# Patient Record
Sex: FEMALE | Race: WHITE | NOT HISPANIC OR LATINO | URBAN - METROPOLITAN AREA
[De-identification: names, ages, dates, MRNs, and addresses within clinical notes are randomized per-mention and may not be internally consistent; named-entity substitution may affect disease eponyms.]

---

## 2017-04-25 ENCOUNTER — EMERGENCY (EMERGENCY)
Facility: HOSPITAL | Age: 22
LOS: 1 days | Discharge: HOME | End: 2017-04-25
Attending: EMERGENCY MEDICINE
Payer: COMMERCIAL

## 2017-04-25 VITALS
DIASTOLIC BLOOD PRESSURE: 85 MMHG | HEIGHT: 63 IN | WEIGHT: 156.97 LBS | SYSTOLIC BLOOD PRESSURE: 131 MMHG | HEART RATE: 80 BPM | OXYGEN SATURATION: 100 % | TEMPERATURE: 98 F | RESPIRATION RATE: 16 BRPM
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
## 2017-04-25 DIAGNOSIS — R51 HEADACHE: ICD-10-CM

## 2017-04-25 DIAGNOSIS — S16.1XXA STRAIN OF MUSCLE, FASCIA AND TENDON AT NECK LEVEL, INITIAL ENCOUNTER: ICD-10-CM
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
## 2017-04-25 DIAGNOSIS — M25.511 PAIN IN RIGHT SHOULDER: ICD-10-CM

## 2017-04-25 DIAGNOSIS — M25.512 PAIN IN LEFT SHOULDER: ICD-10-CM

## 2017-04-25 PROCEDURE — 99283 EMERGENCY DEPT VISIT LOW MDM: CPT

## 2017-04-25 RX ORDER — IBUPROFEN 200 MG
600 TABLET ORAL ONCE
Qty: 0 | Refills: 0 | Status: COMPLETED | OUTPATIENT
Start: 2017-04-25 | End: 2017-04-25

## 2017-04-25 RX ADMIN — Medication 600 MILLIGRAM(S): at 20:15
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
## 2017-04-25 NOTE — ED STATDOCS - MUSCULOSKELETAL, MLM
No midline c-spine TTP, FROM c-spine. No midline thoracic or lumbar spine TTP. Upper and lower extremities: no localized bony TTP, FROM.

## 2017-04-25 NOTE — ED STATDOCS - PLAN OF CARE
Apply ice or heat (your preference) to affected area for 15-20 minutes every few hours for the next few days.  Use as much or as frequently as desired. take ibuprofen 600mg every 6 hours as needed for aches and pains.  Take robaxin every 6 hours as needed for muscle tightness/ spasm.  Avoid heavy lifting and strenuous activity until aches and pains are improved.  Return immediately to the ER for re-evaluation if your symptoms intensify or if need symptoms (not currently present) develop. Otherwise, follow-up with your doctor in 2-3 days for re-examination.
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
## 2017-04-25 NOTE — ED STATDOCS - NS ED MD SCRIBE ATTENDING SCRIBE SECTIONS
REVIEW OF SYSTEMS/PAST MEDICAL/SURGICAL/SOCIAL HISTORY/PHYSICAL EXAM/HIV/VITAL SIGNS( Pullset)/HISTORY OF PRESENT ILLNESS/DISPOSITION

## 2017-06-01 ENCOUNTER — APPOINTMENT (OUTPATIENT)
Dept: ORTHOPEDIC SURGERY | Facility: CLINIC | Age: 22
End: 2017-06-01

## 2017-07-26 ENCOUNTER — OTHER (OUTPATIENT)
Age: 22
End: 2017-07-26

## 2017-08-03 ENCOUNTER — APPOINTMENT (OUTPATIENT)
Dept: ORTHOPEDIC SURGERY | Facility: CLINIC | Age: 22
End: 2017-08-03
Payer: COMMERCIAL

## 2017-08-03 VITALS
BODY MASS INDEX: 26.46 KG/M2 | SYSTOLIC BLOOD PRESSURE: 137 MMHG | HEIGHT: 64 IN | WEIGHT: 155 LBS | DIASTOLIC BLOOD PRESSURE: 87 MMHG | HEART RATE: 57 BPM

## 2017-08-03 DIAGNOSIS — M43.10 SPONDYLOLISTHESIS, SITE UNSPECIFIED: ICD-10-CM

## 2017-08-03 DIAGNOSIS — Z87.39 PERSONAL HISTORY OF OTHER DISEASES OF THE MUSCULOSKELETAL SYSTEM AND CONNECTIVE TISSUE: ICD-10-CM

## 2017-08-03 DIAGNOSIS — Z78.9 OTHER SPECIFIED HEALTH STATUS: ICD-10-CM

## 2017-08-03 PROCEDURE — 99214 OFFICE O/P EST MOD 30 MIN: CPT

## 2019-05-10 ENCOUNTER — APPOINTMENT (OUTPATIENT)
Dept: OBGYN | Facility: CLINIC | Age: 24
End: 2019-05-10
Payer: COMMERCIAL

## 2019-05-10 VITALS
HEIGHT: 64 IN | DIASTOLIC BLOOD PRESSURE: 80 MMHG | SYSTOLIC BLOOD PRESSURE: 110 MMHG | WEIGHT: 150 LBS | BODY MASS INDEX: 25.61 KG/M2

## 2019-05-10 LAB
HCG UR QL: NEGATIVE
QUALITY CONTROL: YES

## 2019-05-10 PROCEDURE — 99203 OFFICE O/P NEW LOW 30 MIN: CPT

## 2019-05-10 NOTE — DISCUSSION/SUMMARY
[FreeTextEntry1] : a24 years old white female came to the office as a new GYN he is complaining of dysmenorrhea that is getting worse that starts the day before her periods and lasts all fluid. Pain goes away with midol but only slightly physical exam within normal limits pelvic exam no adnexal tenderness normal size uterus no further workup needed since the patient is a virgin scars the pelvis was then history sounds like the patient might have endometriosis when placed on birth control pills and Anaprox for pain and follow accordingly I spent 30 minutes with the patient

## 2019-05-10 NOTE — PHYSICAL EXAM
[Alert] : alert [Awake] : awake [Mass] : no breast mass [Acute Distress] : no acute distress [Nipple Discharge] : no nipple discharge [Soft] : soft [Axillary LAD] : no axillary lymphadenopathy [Tender] : non tender [Oriented x3] : oriented to person, place, and time [Normal] : uterus [No Bleeding] : there was no active vaginal bleeding [Uterine Adnexae] : were not tender and not enlarged

## 2019-06-18 ENCOUNTER — APPOINTMENT (OUTPATIENT)
Dept: OBGYN | Facility: CLINIC | Age: 24
End: 2019-06-18
Payer: COMMERCIAL

## 2019-06-18 VITALS
DIASTOLIC BLOOD PRESSURE: 64 MMHG | BODY MASS INDEX: 25.61 KG/M2 | HEIGHT: 64 IN | SYSTOLIC BLOOD PRESSURE: 108 MMHG | WEIGHT: 150 LBS

## 2019-06-18 PROCEDURE — 99213 OFFICE O/P EST LOW 20 MIN: CPT

## 2019-06-18 NOTE — DISCUSSION/SUMMARY
[FreeTextEntry1] : The office urgent visit because of a painful lump on the right breast patient's last period was a week ago physical exam within normal limits on the right breast there is a freely mobile possible cyst at the 11:00 position above the area with a we'll send the patient for sonogram no signs of infection I spent 15 minutes with the patient

## 2019-07-12 ENCOUNTER — RX RENEWAL (OUTPATIENT)
Age: 24
End: 2019-07-12

## 2019-07-12 ENCOUNTER — RESULT REVIEW (OUTPATIENT)
Age: 24
End: 2019-07-12

## 2019-11-08 ENCOUNTER — APPOINTMENT (OUTPATIENT)
Dept: OBGYN | Facility: CLINIC | Age: 24
End: 2019-11-08

## 2020-02-19 ENCOUNTER — TRANSCRIPTION ENCOUNTER (OUTPATIENT)
Age: 25
End: 2020-02-19

## 2020-04-26 ENCOUNTER — MESSAGE (OUTPATIENT)
Age: 25
End: 2020-04-26

## 2020-05-08 ENCOUNTER — APPOINTMENT (OUTPATIENT)
Dept: DISASTER EMERGENCY | Facility: CLINIC | Age: 25
End: 2020-05-08

## 2020-05-08 LAB
SARS-COV-2 IGG SERPL IA-ACNC: 0 INDEX
SARS-COV-2 IGG SERPL QL IA: NEGATIVE

## 2020-11-27 ENCOUNTER — APPOINTMENT (OUTPATIENT)
Dept: INFUSION THERAPY | Facility: HOSPITAL | Age: 25
End: 2020-11-27

## 2020-11-27 ENCOUNTER — LABORATORY RESULT (OUTPATIENT)
Age: 25
End: 2020-11-27

## 2021-03-16 ENCOUNTER — NON-APPOINTMENT (OUTPATIENT)
Age: 26
End: 2021-03-16

## 2021-03-28 ENCOUNTER — RESULT CHARGE (OUTPATIENT)
Age: 26
End: 2021-03-28

## 2021-03-29 ENCOUNTER — NON-APPOINTMENT (OUTPATIENT)
Age: 26
End: 2021-03-29

## 2021-03-29 ENCOUNTER — TRANSCRIPTION ENCOUNTER (OUTPATIENT)
Age: 26
End: 2021-03-29

## 2021-03-29 ENCOUNTER — APPOINTMENT (OUTPATIENT)
Dept: INTERNAL MEDICINE | Facility: CLINIC | Age: 26
End: 2021-03-29
Payer: COMMERCIAL

## 2021-03-29 VITALS
TEMPERATURE: 98 F | RESPIRATION RATE: 16 BRPM | SYSTOLIC BLOOD PRESSURE: 118 MMHG | HEART RATE: 73 BPM | BODY MASS INDEX: 29.19 KG/M2 | DIASTOLIC BLOOD PRESSURE: 72 MMHG | OXYGEN SATURATION: 98 % | WEIGHT: 171 LBS | HEIGHT: 64 IN

## 2021-03-29 DIAGNOSIS — Z13.6 ENCOUNTER FOR SCREENING FOR CARDIOVASCULAR DISORDERS: ICD-10-CM

## 2021-03-29 DIAGNOSIS — Z80.0 FAMILY HISTORY OF MALIGNANT NEOPLASM OF DIGESTIVE ORGANS: ICD-10-CM

## 2021-03-29 DIAGNOSIS — Z87.39 PERSONAL HISTORY OF OTHER DISEASES OF THE MUSCULOSKELETAL SYSTEM AND CONNECTIVE TISSUE: ICD-10-CM

## 2021-03-29 DIAGNOSIS — Z11.4 ENCOUNTER FOR SCREENING FOR HUMAN IMMUNODEFICIENCY VIRUS [HIV]: ICD-10-CM

## 2021-03-29 DIAGNOSIS — N60.01 SOLITARY CYST OF RIGHT BREAST: ICD-10-CM

## 2021-03-29 DIAGNOSIS — Z80.3 FAMILY HISTORY OF MALIGNANT NEOPLASM OF BREAST: ICD-10-CM

## 2021-03-29 DIAGNOSIS — Z83.3 FAMILY HISTORY OF DIABETES MELLITUS: ICD-10-CM

## 2021-03-29 LAB
BILIRUB UR QL STRIP: NORMAL
CLARITY UR: CLEAR
COLLECTION METHOD: NORMAL
GLUCOSE UR-MCNC: NORMAL
HCG UR QL: 0.2 EU/DL
HGB UR QL STRIP.AUTO: ABNORMAL
KETONES UR-MCNC: NORMAL
LEUKOCYTE ESTERASE UR QL STRIP: NORMAL
NITRITE UR QL STRIP: NORMAL
PH UR STRIP: 6.5
PROT UR STRIP-MCNC: NORMAL
SP GR UR STRIP: 1.01

## 2021-03-29 PROCEDURE — 99072 ADDL SUPL MATRL&STAF TM PHE: CPT

## 2021-03-29 PROCEDURE — 99385 PREV VISIT NEW AGE 18-39: CPT | Mod: 25

## 2021-03-29 PROCEDURE — 81003 URINALYSIS AUTO W/O SCOPE: CPT | Mod: QW

## 2021-03-29 PROCEDURE — 36415 COLL VENOUS BLD VENIPUNCTURE: CPT

## 2021-03-29 PROCEDURE — G0442 ANNUAL ALCOHOL SCREEN 15 MIN: CPT | Mod: NC

## 2021-03-29 PROCEDURE — 93000 ELECTROCARDIOGRAM COMPLETE: CPT | Mod: 59

## 2021-03-29 NOTE — HEALTH RISK ASSESSMENT
[Good] : ~his/her~  mood as  good [Yes] : Yes [2 - 4 times a month (2 pts)] : 2-4 times a month (2 points) [3 or 4 (1 pt)] : 3 or 4  (1 point) [Never (0 pts)] : Never (0 points) [No] : In the past 12 months have you used drugs other than those required for medical reasons? No [No falls in past year] : Patient reported no falls in the past year [1] : 2) Feeling down, depressed, or hopeless for several days (1) [HIV Test offered] : HIV Test offered [Hepatitis C test offered] : Hepatitis C test offered [With Family] : lives with family [# of Members in Household ___] :  household currently consist of [unfilled] member(s) [Employed] : employed [College] : College [Single] : single [# Of Children ___] : has [unfilled] children [Feels Safe at Home] : Feels safe at home [Fully functional (bathing, dressing, toileting, transferring, walking, feeding)] : Fully functional (bathing, dressing, toileting, transferring, walking, feeding) [Fully functional (using the telephone, shopping, preparing meals, housekeeping, doing laundry, using] : Fully functional and needs no help or supervision to perform IADLs (using the telephone, shopping, preparing meals, housekeeping, doing laundry, using transportation, managing medications and managing finances) [Reports normal functional visual acuity (ie: able to read med bottle)] : Reports normal functional visual acuity [Smoke Detector] : smoke detector [Carbon Monoxide Detector] : carbon monoxide detector [Seat Belt] :  uses seat belt [Patient/Caregiver unclear of wishes] : Patient/Caregiver unclear of wishes [] : No [Audit-CScore] : 3 [de-identified] : 3x a weel walk  [de-identified] : Regular  [BQQ9Rhern] : 2 [LNI4Svtge] : 5 [Change in mental status noted] : No change in mental status noted [Sexually Active] : not sexually active [Reports changes in vision] : Reports no changes in vision [TB Exposure] : is not being exposed to tuberculosis [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

## 2021-03-29 NOTE — HISTORY OF PRESENT ILLNESS
[FreeTextEntry1] : Physical exam  [de-identified] : Ms. MARCIAL is a 26 year  female, who present to the office for new patient physical exam.\par States she always had anxiety but after being diagnosed with covid back in November her anxiety has gotten worse.  States it does effect her ADL.  When she gets anxiety her finger tingles and she feels chest tightness and heart palpitations\par Takes Naprosyn for painful menses - Denies having a PAP but does see GYN

## 2021-03-29 NOTE — REVIEW OF SYSTEMS
[Fatigue] : fatigue [Dysmenorrhea] : dysmenorrhea [Back Pain] : back pain [Anxiety] : anxiety [Negative] : Heme/Lymph [Fever] : no fever [Chills] : no chills [Hot Flashes] : no hot flashes [Night Sweats] : no night sweats [Recent Change In Weight] : ~T no recent weight change [Pain] : no pain [Vision Problems] : no vision problems [Itching] : no itching [Earache] : no earache [Hoarseness] : no hoarseness [Nasal Discharge] : no nasal discharge [Sore Throat] : no sore throat [Postnasal Drip] : no postnasal drip [Chest Pain] : no chest pain [Palpitations] : no palpitations [Leg Claudication] : no leg claudication [Lower Ext Edema] : no lower extremity edema [Shortness Of Breath] : no shortness of breath [Cough] : no cough [Dyspnea on Exertion] : no dyspnea on exertion [Abdominal Pain] : no abdominal pain [Nausea] : no nausea [Constipation] : no constipation [Diarrhea] : diarrhea [Vomiting] : no vomiting [Heartburn] : no heartburn [Melena] : no melena [Dysuria] : no dysuria [Hematuria] : no hematuria [Frequency] : no frequency [Joint Pain] : no joint pain [Joint Stiffness] : no joint stiffness [Muscle Pain] : no muscle pain [Itching] : no itching [Nail Changes] : no nail changes [Hair Changes] : no hair changes [Skin Rash] : no skin rash [Headache] : no headache [Dizziness] : no dizziness [Memory Loss] : no memory loss [Unsteady Walking] : no ataxia [Depression] : no depression [Swollen Glands] : no swollen glands

## 2021-03-29 NOTE — ASSESSMENT
[FreeTextEntry1] : Ms. MARCIAL is a 26 year  female, who present to the office for new patient physical

## 2021-03-29 NOTE — PHYSICAL EXAM
[No Acute Distress] : no acute distress [Well Nourished] : well nourished [Well Developed] : well developed [Well-Appearing] : well-appearing [Normal Sclera/Conjunctiva] : normal sclera/conjunctiva [PERRL] : pupils equal round and reactive to light [EOMI] : extraocular movements intact [Normal Outer Ear/Nose] : the outer ears and nose were normal in appearance [Normal Oropharynx] : the oropharynx was normal [No JVD] : no jugular venous distention [No Lymphadenopathy] : no lymphadenopathy [Supple] : supple [Thyroid Normal, No Nodules] : the thyroid was normal and there were no nodules present [No Respiratory Distress] : no respiratory distress  [No Accessory Muscle Use] : no accessory muscle use [Clear to Auscultation] : lungs were clear to auscultation bilaterally [Normal Rate] : normal rate  [Regular Rhythm] : with a regular rhythm [Normal S1, S2] : normal S1 and S2 [No Carotid Bruits] : no carotid bruits [No Abdominal Bruit] : a ~M bruit was not heard ~T in the abdomen [No Varicosities] : no varicosities [Pedal Pulses Present] : the pedal pulses are present [No Edema] : there was no peripheral edema [No Palpable Aorta] : no palpable aorta [No Extremity Clubbing/Cyanosis] : no extremity clubbing/cyanosis [Soft] : abdomen soft [Non Tender] : non-tender [Non-distended] : non-distended [No Masses] : no abdominal mass palpated [No HSM] : no HSM [Normal Bowel Sounds] : normal bowel sounds [Normal Posterior Cervical Nodes] : no posterior cervical lymphadenopathy [Normal Anterior Cervical Nodes] : no anterior cervical lymphadenopathy [No CVA Tenderness] : no CVA  tenderness [No Spinal Tenderness] : no spinal tenderness [No Joint Swelling] : no joint swelling [Grossly Normal Strength/Tone] : grossly normal strength/tone [No Rash] : no rash [Coordination Grossly Intact] : coordination grossly intact [No Focal Deficits] : no focal deficits [Normal Gait] : normal gait [Deep Tendon Reflexes (DTR)] : deep tendon reflexes were 2+ and symmetric [Normal Affect] : the affect was normal [Normal Insight/Judgement] : insight and judgment were intact [de-identified] : with murmur 1/6

## 2021-04-01 ENCOUNTER — OUTPATIENT (OUTPATIENT)
Dept: OUTPATIENT SERVICES | Facility: HOSPITAL | Age: 26
LOS: 1 days | End: 2021-04-01
Payer: COMMERCIAL

## 2021-04-01 ENCOUNTER — APPOINTMENT (OUTPATIENT)
Dept: ULTRASOUND IMAGING | Facility: CLINIC | Age: 26
End: 2021-04-01
Payer: COMMERCIAL

## 2021-04-01 DIAGNOSIS — R10.9 UNSPECIFIED ABDOMINAL PAIN: ICD-10-CM

## 2021-04-01 LAB
25(OH)D3 SERPL-MCNC: 15.5 NG/ML
ALBUMIN SERPL ELPH-MCNC: 4.8 G/DL
ALP BLD-CCNC: 67 U/L
ALT SERPL-CCNC: 32 U/L
ANION GAP SERPL CALC-SCNC: 10 MMOL/L
AST SERPL-CCNC: 26 U/L
BACTERIA UR CULT: NORMAL
BASOPHILS # BLD AUTO: 0.02 K/UL
BASOPHILS NFR BLD AUTO: 0.4 %
BILIRUB SERPL-MCNC: 0.5 MG/DL
BUN SERPL-MCNC: 6 MG/DL
CALCIUM SERPL-MCNC: 9.6 MG/DL
CHLORIDE SERPL-SCNC: 102 MMOL/L
CHOLEST SERPL-MCNC: 227 MG/DL
CO2 SERPL-SCNC: 26 MMOL/L
CREAT SERPL-MCNC: 0.7 MG/DL
EOSINOPHIL # BLD AUTO: 0.06 K/UL
EOSINOPHIL NFR BLD AUTO: 1.3 %
ESTROGEN SERPL-MCNC: 115 PG/ML
FSH SERPL-MCNC: 6 IU/L
GLUCOSE SERPL-MCNC: 92 MG/DL
HCT VFR BLD CALC: 33.1 %
HCV AB SER QL: NONREACTIVE
HCV S/CO RATIO: 0.08 S/CO
HDLC SERPL-MCNC: 47 MG/DL
HGB BLD-MCNC: 11.1 G/DL
HIV1+2 AB SPEC QL IA.RAPID: NONREACTIVE
IMM GRANULOCYTES NFR BLD AUTO: 0.2 %
LDLC SERPL CALC-MCNC: 159 MG/DL
LH SERPL-ACNC: 4.6 IU/L
LYMPHOCYTES # BLD AUTO: 1.61 K/UL
LYMPHOCYTES NFR BLD AUTO: 35.2 %
MAN DIFF?: NORMAL
MCHC RBC-ENTMCNC: 32.2 PG
MCHC RBC-ENTMCNC: 33.5 GM/DL
MCV RBC AUTO: 95.9 FL
MONOCYTES # BLD AUTO: 0.31 K/UL
MONOCYTES NFR BLD AUTO: 6.8 %
NEUTROPHILS # BLD AUTO: 2.56 K/UL
NEUTROPHILS NFR BLD AUTO: 56.1 %
NONHDLC SERPL-MCNC: 180 MG/DL
PLATELET # BLD AUTO: 210 K/UL
POTASSIUM SERPL-SCNC: 4.1 MMOL/L
PROT SERPL-MCNC: 7 G/DL
RBC # BLD: 3.45 M/UL
RBC # FLD: 12.5 %
SODIUM SERPL-SCNC: 138 MMOL/L
TESTOST SERPL-MCNC: 59.9 NG/DL
TRIGL SERPL-MCNC: 104 MG/DL
TSH SERPL-ACNC: 1.26 UIU/ML
WBC # FLD AUTO: 4.57 K/UL

## 2021-04-01 PROCEDURE — 76856 US EXAM PELVIC COMPLETE: CPT | Mod: 26

## 2021-04-01 PROCEDURE — 76856 US EXAM PELVIC COMPLETE: CPT

## 2021-04-05 ENCOUNTER — APPOINTMENT (OUTPATIENT)
Dept: OBGYN | Facility: CLINIC | Age: 26
End: 2021-04-05
Payer: COMMERCIAL

## 2021-04-05 VITALS — TEMPERATURE: 97.4 F

## 2021-04-05 PROCEDURE — 99072 ADDL SUPL MATRL&STAF TM PHE: CPT

## 2021-04-05 PROCEDURE — 99395 PREV VISIT EST AGE 18-39: CPT

## 2021-04-05 NOTE — DISCUSSION/SUMMARY
[FreeTextEntry1] : ZEFERINO MARCIAL is a 26 year female here for WWE, chronic pelvic possible endometriosis, normal exam\par - pap/hpv, GC/CH sent\par - TA US from last week - report pending but imaging looks WNL\par - SBE reviewed\par - Birth Control discussed - recommended mirena IUD, also discussed orlissa\par - free testosterone and dhea-s sent\par - will call with results and patient will let me know what she decides

## 2021-04-05 NOTE — PHYSICAL EXAM
[Appropriately responsive] : appropriately responsive [Alert] : alert [No Acute Distress] : no acute distress [No Lymphadenopathy] : no lymphadenopathy [Regular Rate Rhythm] : regular rate rhythm [Soft] : soft [Non-tender] : non-tender [Non-distended] : non-distended [No HSM] : No HSM [No Lesions] : no lesions [No Mass] : no mass [Oriented x3] : oriented x3 [Examination Of The Breasts] : a normal appearance [No Masses] : no breast masses were palpable [Labia Majora] : normal [Labia Minora] : normal [Normal] : normal [Uterine Adnexae] : normal

## 2021-04-06 LAB
C TRACH RRNA SPEC QL NAA+PROBE: NOT DETECTED
HPV HIGH+LOW RISK DNA PNL CVX: NOT DETECTED
N GONORRHOEA RRNA SPEC QL NAA+PROBE: NOT DETECTED
SOURCE TP AMPLIFICATION: NORMAL

## 2021-04-07 LAB — CYTOLOGY CVX/VAG DOC THIN PREP: NORMAL

## 2021-04-09 ENCOUNTER — NON-APPOINTMENT (OUTPATIENT)
Age: 26
End: 2021-04-09

## 2021-04-12 LAB
DHEA-SULFATE, SERUM: 451 UG/DL
TESTOST BND SERPL-MCNC: 1.8 PG/ML
TESTOST SERPL-MCNC: 25.5 NG/DL

## 2021-04-14 ENCOUNTER — NON-APPOINTMENT (OUTPATIENT)
Age: 26
End: 2021-04-14

## 2021-05-21 ENCOUNTER — APPOINTMENT (OUTPATIENT)
Dept: INTERNAL MEDICINE | Facility: CLINIC | Age: 26
End: 2021-05-21
Payer: COMMERCIAL

## 2021-05-21 VITALS
HEART RATE: 73 BPM | OXYGEN SATURATION: 98 % | RESPIRATION RATE: 16 BRPM | BODY MASS INDEX: 28.17 KG/M2 | TEMPERATURE: 97.2 F | HEIGHT: 64 IN | DIASTOLIC BLOOD PRESSURE: 72 MMHG | WEIGHT: 165 LBS | SYSTOLIC BLOOD PRESSURE: 116 MMHG

## 2021-05-21 PROCEDURE — 99213 OFFICE O/P EST LOW 20 MIN: CPT

## 2021-05-21 PROCEDURE — 99072 ADDL SUPL MATRL&STAF TM PHE: CPT

## 2021-05-24 NOTE — HISTORY OF PRESENT ILLNESS
[FreeTextEntry1] : Medication renewal and general check up [de-identified] : Ms. MARCIAL is a 26 year  female, who present to the office for medication renewal and a general check up.  States she is doing well on the lexapro.  Denies any side effects to current dose. \par Denies any recent illnesses \par Did see GYN dx with ? PCOS  on BCP

## 2021-05-24 NOTE — REVIEW OF SYSTEMS
[Fatigue] : fatigue [Dysmenorrhea] : dysmenorrhea [Anxiety] : anxiety [Negative] : Heme/Lymph [Fever] : no fever [Chills] : no chills [Hot Flashes] : no hot flashes [Night Sweats] : no night sweats [Recent Change In Weight] : ~T no recent weight change [Pain] : no pain [Vision Problems] : no vision problems [Itching] : no itching [Earache] : no earache [Hoarseness] : no hoarseness [Nasal Discharge] : no nasal discharge [Sore Throat] : no sore throat [Chest Pain] : no chest pain [Postnasal Drip] : no postnasal drip [Palpitations] : no palpitations [Leg Claudication] : no leg claudication [Lower Ext Edema] : no lower extremity edema [Shortness Of Breath] : no shortness of breath [Cough] : no cough [Dyspnea on Exertion] : no dyspnea on exertion [Abdominal Pain] : no abdominal pain [Nausea] : no nausea [Constipation] : no constipation [Diarrhea] : diarrhea [Vomiting] : no vomiting [Heartburn] : no heartburn [Melena] : no melena [Dysuria] : no dysuria [Hematuria] : no hematuria [Frequency] : no frequency [Joint Pain] : no joint pain [Joint Stiffness] : no joint stiffness [Muscle Pain] : no muscle pain [Back Pain] : no back pain [Itching] : no itching [Nail Changes] : no nail changes [Hair Changes] : no hair changes [Skin Rash] : no skin rash [Headache] : no headache [Dizziness] : no dizziness [Memory Loss] : no memory loss [Unsteady Walking] : no ataxia [Depression] : no depression [Swollen Glands] : no swollen glands

## 2021-05-24 NOTE — PLAN
[FreeTextEntry1] : cardiopulmonary  -Screening for CAD- Murmur    We reviewed and discussed the EKG.  Patient was advised the importance of participating in aerobic exercise programs improve their stamina. \par \par GYN- Dysmenorrhea - PCOS - Started on BCP -Follow up with GYN\par Repeat testosternoe next ov \par \par Psych -Anxiety -  Counseling given to the pt.  Continue with  Lexapro.  \par RTO 4-6 months prn \par \par Ophto- Advised to have an EYE exam \par \par Dermatology ZEFERINO was encouraged to wear sun protective clothing, sun block, and proper use of SPF board brim hats, to seek shade and to avoid the sun in peak hours.  ABCD of skin moles was advised. \par \par Patient  education  - COVID-19   Counseling and education provided to the patient.  Advised sign and symptoms of the virus.  Advised contact precautions.  Educated patient on proper hand washing and to participate in social distancing. Had Moderna vax\par \par Patient is in full awareness of the plan and agrees to it.  All pt question was answered.  \par \par

## 2021-05-24 NOTE — HEALTH RISK ASSESSMENT
[Yes] : Yes [2 - 4 times a month (2 pts)] : 2-4 times a month (2 points) [3 or 4 (1 pt)] : 3 or 4  (1 point) [Never (0 pts)] : Never (0 points) [No] : In the past 12 months have you used drugs other than those required for medical reasons? No [No falls in past year] : Patient reported no falls in the past year [1] : 2) Feeling down, depressed, or hopeless for several days (1) [] : No [Audit-CScore] : 3 [de-identified] : 3x a weel walk  [de-identified] : Regular  [[id number]] : 2 [CCD1Tcosv] : 5

## 2021-05-24 NOTE — ASSESSMENT
[FreeTextEntry1] : Ms. MARCIAL is a 26 year  female, who present to the office for medication renewal and a general check up

## 2021-05-24 NOTE — PHYSICAL EXAM
[No Acute Distress] : no acute distress [Well Nourished] : well nourished [Well Developed] : well developed [Well-Appearing] : well-appearing [Normal Sclera/Conjunctiva] : normal sclera/conjunctiva [PERRL] : pupils equal round and reactive to light [EOMI] : extraocular movements intact [Normal Outer Ear/Nose] : the outer ears and nose were normal in appearance [Normal Oropharynx] : the oropharynx was normal [No JVD] : no jugular venous distention [No Lymphadenopathy] : no lymphadenopathy [Supple] : supple [Thyroid Normal, No Nodules] : the thyroid was normal and there were no nodules present [No Respiratory Distress] : no respiratory distress  [No Accessory Muscle Use] : no accessory muscle use [Clear to Auscultation] : lungs were clear to auscultation bilaterally [Normal Rate] : normal rate  [Regular Rhythm] : with a regular rhythm [Normal S1, S2] : normal S1 and S2 [No Carotid Bruits] : no carotid bruits [No Abdominal Bruit] : a ~M bruit was not heard ~T in the abdomen [No Varicosities] : no varicosities [Pedal Pulses Present] : the pedal pulses are present [No Edema] : there was no peripheral edema [No Palpable Aorta] : no palpable aorta [No Extremity Clubbing/Cyanosis] : no extremity clubbing/cyanosis [Soft] : abdomen soft [Non Tender] : non-tender [Non-distended] : non-distended [No Masses] : no abdominal mass palpated [No HSM] : no HSM [Normal Bowel Sounds] : normal bowel sounds [Normal Posterior Cervical Nodes] : no posterior cervical lymphadenopathy [Normal Anterior Cervical Nodes] : no anterior cervical lymphadenopathy [No CVA Tenderness] : no CVA  tenderness [No Spinal Tenderness] : no spinal tenderness [No Joint Swelling] : no joint swelling [Grossly Normal Strength/Tone] : grossly normal strength/tone [No Rash] : no rash [Coordination Grossly Intact] : coordination grossly intact [No Focal Deficits] : no focal deficits [Normal Gait] : normal gait [Deep Tendon Reflexes (DTR)] : deep tendon reflexes were 2+ and symmetric [Normal Affect] : the affect was normal [Normal Insight/Judgement] : insight and judgment were intact [de-identified] : with murmur 1/6

## 2021-08-11 ENCOUNTER — TRANSCRIPTION ENCOUNTER (OUTPATIENT)
Age: 26
End: 2021-08-11

## 2021-09-13 ENCOUNTER — RX RENEWAL (OUTPATIENT)
Age: 26
End: 2021-09-13

## 2021-09-16 ENCOUNTER — NON-APPOINTMENT (OUTPATIENT)
Age: 26
End: 2021-09-16

## 2021-09-16 ENCOUNTER — APPOINTMENT (OUTPATIENT)
Dept: DERMATOLOGY | Facility: CLINIC | Age: 26
End: 2021-09-16
Payer: COMMERCIAL

## 2021-09-16 VITALS — HEIGHT: 64 IN | WEIGHT: 160 LBS | BODY MASS INDEX: 27.31 KG/M2

## 2021-09-16 DIAGNOSIS — L72.3 SEBACEOUS CYST: ICD-10-CM

## 2021-09-16 PROCEDURE — 99203 OFFICE O/P NEW LOW 30 MIN: CPT | Mod: GC,25

## 2021-09-16 PROCEDURE — 11900 INJECT SKIN LESIONS </W 7: CPT | Mod: GC

## 2021-11-19 ENCOUNTER — RX RENEWAL (OUTPATIENT)
Age: 26
End: 2021-11-19

## 2021-11-23 ENCOUNTER — APPOINTMENT (OUTPATIENT)
Dept: INTERNAL MEDICINE | Facility: CLINIC | Age: 26
End: 2021-11-23
Payer: COMMERCIAL

## 2021-11-23 VITALS
RESPIRATION RATE: 16 BRPM | HEART RATE: 78 BPM | SYSTOLIC BLOOD PRESSURE: 130 MMHG | HEIGHT: 64 IN | BODY MASS INDEX: 29.08 KG/M2 | OXYGEN SATURATION: 99 % | DIASTOLIC BLOOD PRESSURE: 84 MMHG | WEIGHT: 170.31 LBS | TEMPERATURE: 98.2 F

## 2021-11-23 DIAGNOSIS — R19.7 DIARRHEA, UNSPECIFIED: ICD-10-CM

## 2021-11-23 PROCEDURE — 99213 OFFICE O/P EST LOW 20 MIN: CPT

## 2021-11-23 NOTE — HISTORY OF PRESENT ILLNESS
[FreeTextEntry1] : Medication renewal and abdominal discomfort/ bloated feeling  [de-identified] : Ms. MARCIAL is a 26 year  female, who present to the office for medication renewal and a general check up.  States she is doing well on the Lexapro with controlling anxiety/depression. Liliam has noticed since  Been having diarrhea and bloated  Since staring the medication been more bloated and having diarrhea a few times a week.  Does have normal BM as well \par \par \par Did get the covid booster and flu shot  through [facility]

## 2021-11-23 NOTE — PLAN
[FreeTextEntry1] : GYN- Dysmenorrhea - PCOS - Started on BCP -Follow up with GYN\par \par Psych -Anxiety -  Counseling given to the pt.  Continue with  Lexapro.  \par Monitor labs - If GI symptoms persist will switch to another SSRI \par \par GI - diarrhea and bloated feeling -  FODMOP diet -  Check abdominal sonogram \par Counseling given - Advised food diary \par \par Dermatology ZEFERINO was encouraged to wear sun protective clothing, sun block, and proper use of SPF board brim hats, to seek shade and to avoid the sun in peak hours.  ABCD of skin moles was advised. \par \par Patient  education  - COVID-19   Counseling and education provided to the patient.  Advised sign and symptoms of the virus.  Advised contact precautions.  Educated patient on proper hand washing and to participate in social distancing. Had Moderna vax\par \par Patient is in full awareness of the plan and agrees to it.  All pt question was answered.  \par \par Completed flu vax with [facility] \par

## 2021-11-23 NOTE — REVIEW OF SYSTEMS
[Fatigue] : fatigue [Dysmenorrhea] : dysmenorrhea [Anxiety] : anxiety [Negative] : Heme/Lymph [Fever] : no fever [Chills] : no chills [Hot Flashes] : no hot flashes [Night Sweats] : no night sweats [Recent Change In Weight] : ~T no recent weight change [Pain] : no pain [Vision Problems] : no vision problems [Itching] : no itching [Earache] : no earache [Hoarseness] : no hoarseness [Nasal Discharge] : no nasal discharge [Sore Throat] : no sore throat [Postnasal Drip] : no postnasal drip [Chest Pain] : no chest pain [Palpitations] : no palpitations [Leg Claudication] : no leg claudication [Lower Ext Edema] : no lower extremity edema [Shortness Of Breath] : no shortness of breath [Cough] : no cough [Dyspnea on Exertion] : no dyspnea on exertion [Abdominal Pain] : no abdominal pain [Nausea] : no nausea [Constipation] : no constipation [Diarrhea] : diarrhea [Vomiting] : no vomiting [Heartburn] : no heartburn [Melena] : no melena [Dysuria] : no dysuria [Hematuria] : no hematuria [Frequency] : no frequency [Joint Pain] : no joint pain [Joint Stiffness] : no joint stiffness [Muscle Pain] : no muscle pain [Back Pain] : no back pain [Itching] : no itching [Nail Changes] : no nail changes [Hair Changes] : no hair changes [Skin Rash] : no skin rash [Headache] : no headache [Dizziness] : no dizziness [Memory Loss] : no memory loss [Unsteady Walking] : no ataxia [Depression] : no depression [Swollen Glands] : no swollen glands

## 2021-11-23 NOTE — PHYSICAL EXAM
[No Acute Distress] : no acute distress [Well Nourished] : well nourished [Well Developed] : well developed [Well-Appearing] : well-appearing [Normal Sclera/Conjunctiva] : normal sclera/conjunctiva [PERRL] : pupils equal round and reactive to light [EOMI] : extraocular movements intact [Normal Outer Ear/Nose] : the outer ears and nose were normal in appearance [Normal Oropharynx] : the oropharynx was normal [No JVD] : no jugular venous distention [No Lymphadenopathy] : no lymphadenopathy [Supple] : supple [Thyroid Normal, No Nodules] : the thyroid was normal and there were no nodules present [No Respiratory Distress] : no respiratory distress  [No Accessory Muscle Use] : no accessory muscle use [Clear to Auscultation] : lungs were clear to auscultation bilaterally [Normal Rate] : normal rate  [Regular Rhythm] : with a regular rhythm [Normal S1, S2] : normal S1 and S2 [No Carotid Bruits] : no carotid bruits [No Abdominal Bruit] : a ~M bruit was not heard ~T in the abdomen [No Varicosities] : no varicosities [Pedal Pulses Present] : the pedal pulses are present [No Edema] : there was no peripheral edema [No Palpable Aorta] : no palpable aorta [No Extremity Clubbing/Cyanosis] : no extremity clubbing/cyanosis [Soft] : abdomen soft [Non Tender] : non-tender [Non-distended] : non-distended [No Masses] : no abdominal mass palpated [No HSM] : no HSM [Normal Bowel Sounds] : normal bowel sounds [Normal Posterior Cervical Nodes] : no posterior cervical lymphadenopathy [Normal Anterior Cervical Nodes] : no anterior cervical lymphadenopathy [No CVA Tenderness] : no CVA  tenderness [No Spinal Tenderness] : no spinal tenderness [No Joint Swelling] : no joint swelling [Grossly Normal Strength/Tone] : grossly normal strength/tone [No Rash] : no rash [Coordination Grossly Intact] : coordination grossly intact [No Focal Deficits] : no focal deficits [Normal Gait] : normal gait [Deep Tendon Reflexes (DTR)] : deep tendon reflexes were 2+ and symmetric [Normal Affect] : the affect was normal [Normal Insight/Judgement] : insight and judgment were intact [Speech Grossly Normal] : speech grossly normal [Alert and Oriented x3] : oriented to person, place, and time [Normal Mood] : the mood was normal [de-identified] : with murmur 1/6 [de-identified] : n

## 2021-11-23 NOTE — HEALTH RISK ASSESSMENT
[Yes] : Yes [2 - 4 times a month (2 pts)] : 2-4 times a month (2 points) [3 or 4 (1 pt)] : 3 or 4  (1 point) [Never (0 pts)] : Never (0 points) [No] : In the past 12 months have you used drugs other than those required for medical reasons? No [No falls in past year] : Patient reported no falls in the past year [1] : 2) Feeling down, depressed, or hopeless for several days (1) [] : No [Audit-CScore] : 3 [de-identified] : 3x a weel walk  [de-identified] : Regular  [[id number]] : 2

## 2021-11-23 NOTE — ASSESSMENT
[FreeTextEntry1] : Mrs. MARCIAL is a 26 year  female, who present to the office for medication renewal and abdominal bloating/ diarrhea

## 2021-11-24 ENCOUNTER — OUTPATIENT (OUTPATIENT)
Dept: OUTPATIENT SERVICES | Facility: HOSPITAL | Age: 26
LOS: 1 days | End: 2021-11-24
Payer: COMMERCIAL

## 2021-11-24 ENCOUNTER — APPOINTMENT (OUTPATIENT)
Dept: ULTRASOUND IMAGING | Facility: IMAGING CENTER | Age: 26
End: 2021-11-24
Payer: COMMERCIAL

## 2021-11-24 DIAGNOSIS — R19.7 DIARRHEA, UNSPECIFIED: ICD-10-CM

## 2021-11-24 DIAGNOSIS — R10.9 UNSPECIFIED ABDOMINAL PAIN: ICD-10-CM

## 2021-11-24 PROCEDURE — 76700 US EXAM ABDOM COMPLETE: CPT

## 2021-11-24 PROCEDURE — 76700 US EXAM ABDOM COMPLETE: CPT | Mod: 26

## 2021-11-26 ENCOUNTER — TRANSCRIPTION ENCOUNTER (OUTPATIENT)
Age: 26
End: 2021-11-26

## 2021-12-06 ENCOUNTER — NON-APPOINTMENT (OUTPATIENT)
Age: 26
End: 2021-12-06

## 2022-01-25 ENCOUNTER — RX RENEWAL (OUTPATIENT)
Age: 27
End: 2022-01-25

## 2022-03-16 ENCOUNTER — RESULT CHARGE (OUTPATIENT)
Age: 27
End: 2022-03-16

## 2022-03-17 ENCOUNTER — APPOINTMENT (OUTPATIENT)
Dept: INTERNAL MEDICINE | Facility: CLINIC | Age: 27
End: 2022-03-17
Payer: COMMERCIAL

## 2022-03-17 ENCOUNTER — NON-APPOINTMENT (OUTPATIENT)
Age: 27
End: 2022-03-17

## 2022-03-17 VITALS
HEIGHT: 64 IN | TEMPERATURE: 98.1 F | HEART RATE: 82 BPM | DIASTOLIC BLOOD PRESSURE: 80 MMHG | RESPIRATION RATE: 16 BRPM | WEIGHT: 170 LBS | OXYGEN SATURATION: 98 % | SYSTOLIC BLOOD PRESSURE: 134 MMHG | BODY MASS INDEX: 29.02 KG/M2

## 2022-03-17 DIAGNOSIS — R63.5 ABNORMAL WEIGHT GAIN: ICD-10-CM

## 2022-03-17 DIAGNOSIS — Z13.21 ENCOUNTER FOR SCREENING FOR NUTRITIONAL DISORDER: ICD-10-CM

## 2022-03-17 DIAGNOSIS — Z00.00 ENCOUNTER FOR GENERAL ADULT MEDICAL EXAMINATION W/OUT ABNORMAL FINDINGS: ICD-10-CM

## 2022-03-17 LAB
BILIRUB UR QL STRIP: NORMAL
CLARITY UR: CLEAR
COLLECTION METHOD: NORMAL
GLUCOSE UR-MCNC: NORMAL
HCG UR QL: 0.2 EU/DL
HGB UR QL STRIP.AUTO: ABNORMAL
KETONES UR-MCNC: NORMAL
LEUKOCYTE ESTERASE UR QL STRIP: NORMAL
NITRITE UR QL STRIP: NORMAL
PH UR STRIP: 6
PROT UR STRIP-MCNC: NORMAL
SP GR UR STRIP: 1.01

## 2022-03-17 PROCEDURE — 99395 PREV VISIT EST AGE 18-39: CPT | Mod: 25

## 2022-03-17 PROCEDURE — 93000 ELECTROCARDIOGRAM COMPLETE: CPT | Mod: 59

## 2022-03-17 PROCEDURE — 81003 URINALYSIS AUTO W/O SCOPE: CPT | Mod: QW

## 2022-03-17 RX ORDER — NAPROXEN SODIUM 550 MG/1
550 TABLET ORAL
Qty: 180 | Refills: 3 | Status: DISCONTINUED | COMMUNITY
Start: 2019-05-10 | End: 2022-03-17

## 2022-03-21 PROBLEM — Z13.21 ENCOUNTER FOR VITAMIN DEFICIENCY SCREENING: Status: ACTIVE | Noted: 2021-03-29

## 2022-03-21 PROBLEM — Z00.00 ENCOUNTER FOR PREVENTIVE HEALTH EXAMINATION: Status: ACTIVE | Noted: 2017-05-19

## 2022-03-21 NOTE — PLAN
[FreeTextEntry1] : Cardiopulmonary  -Screening for CAD- Murmur    We reviewed and discussed the EKG.  Patient was advised the importance of participating in aerobic exercise programs improve their stamina.  Check labs \par \par GYN- Dysmenorrhea - Discussed maybe starting metformin for PCOS and might aid in weight loss -Advised to discuss with GYN- check labs - Follow up with GYN\par \par Ophto- Advised to have an EYE exam \par \par Psych-  anxiety - check labs - Counseling given - Continue with Lexapro\par  \par Dermatology ZEFERINO was encouraged to wear sun protective clothing, sun block, and proper use of SPF board brim hats, to seek shade and to avoid the sun in peak hours.  ABCD of skin moles was advised. \par \par  - hematuria - check urine culture.\par \par Patient  education  - COVID-19   Counseling and education provided to the patient.  Advised sign and symptoms of the virus.  Advised contact precautions.  Educated patient on proper hand washing and to participate in social distancing. Had Moderna vax.  \par \par Patient is in full awareness of the plan and agrees to it.  All pt question was answered.  \par \par  rxn to go to the lab for fasting labs was given to the pt \par

## 2022-03-21 NOTE — HEALTH RISK ASSESSMENT
[Good] : ~his/her~  mood as  good [Yes] : Yes [2 - 4 times a month (2 pts)] : 2-4 times a month (2 points) [3 or 4 (1 pt)] : 3 or 4  (1 point) [Never (0 pts)] : Never (0 points) [No] : In the past 12 months have you used drugs other than those required for medical reasons? No [No falls in past year] : Patient reported no falls in the past year [HIV Test offered] : HIV Test offered [Hepatitis C test offered] : Hepatitis C test offered [With Family] : lives with family [# of Members in Household ___] :  household currently consist of [unfilled] member(s) [Employed] : employed [College] : College [Single] : single [# Of Children ___] : has [unfilled] children [Feels Safe at Home] : Feels safe at home [Fully functional (bathing, dressing, toileting, transferring, walking, feeding)] : Fully functional (bathing, dressing, toileting, transferring, walking, feeding) [Fully functional (using the telephone, shopping, preparing meals, housekeeping, doing laundry, using] : Fully functional and needs no help or supervision to perform IADLs (using the telephone, shopping, preparing meals, housekeeping, doing laundry, using transportation, managing medications and managing finances) [Reports normal functional visual acuity (ie: able to read med bottle)] : Reports normal functional visual acuity [Carbon Monoxide Detector] : carbon monoxide detector [Smoke Detector] : smoke detector [Seat Belt] :  uses seat belt [Never] : Never [0] : 2) Feeling down, depressed, or hopeless: Not at all (0) [PHQ-2 Negative - No further assessment needed] : PHQ-2 Negative - No further assessment needed [FreeTextEntry1] : weight gain  [de-identified] : GYN [Audit-CScore] : 3 [de-identified] : 3x a week  walk daily - gym -  incline walk  [de-identified] : Regular  [CMO6Mxwto] : 0 [Change in mental status noted] : No change in mental status noted [Sexually Active] : not sexually active [Reports changes in vision] : Reports no changes in vision [TB Exposure] : is not being exposed to tuberculosis [PapSmearComments] : 04/06.22 has an appointment  [HIVDate] : 03/21  [HIVComments] : negative [HepatitisCDate] : 03/21  [HepatitisCComments] : negative  [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

## 2022-03-21 NOTE — DATA REVIEWED
[FreeTextEntry1] : EKG NSR rate 65 no acute changes noted \par \par reviewed prior labs from 3/21    Cholesterol 227 \par cmp normal\par CBC hgb/ HCT (low)\par vitamin d (L)\par \par U/A hematuria - Check culture \par

## 2022-03-21 NOTE — PHYSICAL EXAM
[No Acute Distress] : no acute distress [Well Nourished] : well nourished [Well Developed] : well developed [Well-Appearing] : well-appearing [Normal Sclera/Conjunctiva] : normal sclera/conjunctiva [PERRL] : pupils equal round and reactive to light [EOMI] : extraocular movements intact [Normal Outer Ear/Nose] : the outer ears and nose were normal in appearance [Normal Oropharynx] : the oropharynx was normal [No JVD] : no jugular venous distention [No Lymphadenopathy] : no lymphadenopathy [Supple] : supple [Thyroid Normal, No Nodules] : the thyroid was normal and there were no nodules present [No Respiratory Distress] : no respiratory distress  [No Accessory Muscle Use] : no accessory muscle use [Clear to Auscultation] : lungs were clear to auscultation bilaterally [Normal Rate] : normal rate  [Regular Rhythm] : with a regular rhythm [Normal S1, S2] : normal S1 and S2 [No Carotid Bruits] : no carotid bruits [No Abdominal Bruit] : a ~M bruit was not heard ~T in the abdomen [No Varicosities] : no varicosities [Pedal Pulses Present] : the pedal pulses are present [No Edema] : there was no peripheral edema [No Palpable Aorta] : no palpable aorta [No Extremity Clubbing/Cyanosis] : no extremity clubbing/cyanosis [Soft] : abdomen soft [Non Tender] : non-tender [Non-distended] : non-distended [No Masses] : no abdominal mass palpated [No HSM] : no HSM [Normal Bowel Sounds] : normal bowel sounds [Normal Posterior Cervical Nodes] : no posterior cervical lymphadenopathy [Normal Anterior Cervical Nodes] : no anterior cervical lymphadenopathy [No CVA Tenderness] : no CVA  tenderness [No Spinal Tenderness] : no spinal tenderness [No Joint Swelling] : no joint swelling [Grossly Normal Strength/Tone] : grossly normal strength/tone [No Rash] : no rash [Coordination Grossly Intact] : coordination grossly intact [No Focal Deficits] : no focal deficits [Normal Gait] : normal gait [Deep Tendon Reflexes (DTR)] : deep tendon reflexes were 2+ and symmetric [Normal Affect] : the affect was normal [Normal Insight/Judgement] : insight and judgment were intact [de-identified] : with murmur 1/6

## 2022-03-21 NOTE — REVIEW OF SYSTEMS
[Fatigue] : fatigue [Dysmenorrhea] : dysmenorrhea [Anxiety] : anxiety [Negative] : Constitutional [Fever] : no fever [Chills] : no chills [Hot Flashes] : no hot flashes [Night Sweats] : no night sweats [Recent Change In Weight] : ~T no recent weight change [Pain] : no pain [Vision Problems] : no vision problems [Itching] : no itching [Earache] : no earache [Nosebleed] : no nosebleeds [Hoarseness] : no hoarseness [Nasal Discharge] : no nasal discharge [Sore Throat] : no sore throat [Postnasal Drip] : no postnasal drip [Chest Pain] : no chest pain [Palpitations] : no palpitations [Leg Claudication] : no leg claudication [Lower Ext Edema] : no lower extremity edema [Shortness Of Breath] : no shortness of breath [Cough] : no cough [Dyspnea on Exertion] : no dyspnea on exertion [Abdominal Pain] : no abdominal pain [Nausea] : no nausea [Constipation] : no constipation [Diarrhea] : diarrhea [Vomiting] : no vomiting [Heartburn] : no heartburn [Melena] : no melena [Dysuria] : no dysuria [Hematuria] : no hematuria [Frequency] : no frequency [Joint Pain] : no joint pain [Joint Stiffness] : no joint stiffness [Muscle Pain] : no muscle pain [Back Pain] : no back pain [Itching] : no itching [Nail Changes] : no nail changes [Hair Changes] : no hair changes [Skin Rash] : no skin rash [Headache] : no headache [Dizziness] : no dizziness [Memory Loss] : no memory loss [Unsteady Walking] : no ataxia [Depression] : no depression [Swollen Glands] : no swollen glands

## 2022-03-21 NOTE — HISTORY OF PRESENT ILLNESS
[FreeTextEntry1] : Physical exam  [de-identified] : Mrs. MARCIAL is a 27 year  female, who present to the office for physical exam \par General feels well. \par Requesting a renewal of Lexapro - doing well on current medication regimen - Denies any side effects to current meds\par HX PCOS - Going to see GYN next week \par \par

## 2022-03-23 ENCOUNTER — NON-APPOINTMENT (OUTPATIENT)
Age: 27
End: 2022-03-23

## 2022-03-29 ENCOUNTER — RX RENEWAL (OUTPATIENT)
Age: 27
End: 2022-03-29

## 2022-03-29 LAB
25(OH)D3 SERPL-MCNC: 48.3 NG/ML
ALBUMIN SERPL ELPH-MCNC: 4.8 G/DL
ALP BLD-CCNC: 58 U/L
ALT SERPL-CCNC: 26 U/L
ANION GAP SERPL CALC-SCNC: 14 MMOL/L
AST SERPL-CCNC: 23 U/L
BACTERIA UR CULT: NORMAL
BASOPHILS # BLD AUTO: 0.02 K/UL
BASOPHILS NFR BLD AUTO: 0.3 %
BILIRUB SERPL-MCNC: 0.4 MG/DL
BUN SERPL-MCNC: 10 MG/DL
CALCIUM SERPL-MCNC: 9.7 MG/DL
CHLORIDE SERPL-SCNC: 102 MMOL/L
CHOLEST SERPL-MCNC: 260 MG/DL
CO2 SERPL-SCNC: 21 MMOL/L
CREAT SERPL-MCNC: 0.66 MG/DL
DHEA-SULFATE, SERUM: 303 UG/DL
EGFR: 122 ML/MIN/1.73M2
EOSINOPHIL # BLD AUTO: 0.05 K/UL
EOSINOPHIL NFR BLD AUTO: 0.8 %
ESTIMATED AVERAGE GLUCOSE: 100 MG/DL
GLUCOSE SERPL-MCNC: 86 MG/DL
HBA1C MFR BLD HPLC: 5.1 %
HCT VFR BLD CALC: 37.9 %
HDLC SERPL-MCNC: 43 MG/DL
HGB BLD-MCNC: 12.5 G/DL
IMM GRANULOCYTES NFR BLD AUTO: 0.2 %
LDLC SERPL CALC-MCNC: 186 MG/DL
LYMPHOCYTES # BLD AUTO: 1.92 K/UL
LYMPHOCYTES NFR BLD AUTO: 30.1 %
MAN DIFF?: NORMAL
MCHC RBC-ENTMCNC: 31.6 PG
MCHC RBC-ENTMCNC: 33 GM/DL
MCV RBC AUTO: 95.9 FL
MONOCYTES # BLD AUTO: 0.51 K/UL
MONOCYTES NFR BLD AUTO: 8 %
NEUTROPHILS # BLD AUTO: 3.86 K/UL
NEUTROPHILS NFR BLD AUTO: 60.6 %
NONHDLC SERPL-MCNC: 217 MG/DL
PLATELET # BLD AUTO: 185 K/UL
POTASSIUM SERPL-SCNC: 4.4 MMOL/L
PROT SERPL-MCNC: 7.1 G/DL
RBC # BLD: 3.95 M/UL
RBC # FLD: 11.9 %
SODIUM SERPL-SCNC: 138 MMOL/L
TESTOST FREE SERPL-MCNC: 3.9 PG/ML
TESTOST SERPL-MCNC: 52.6 NG/DL
TRIGL SERPL-MCNC: 156 MG/DL
TSH SERPL-ACNC: 1.03 UIU/ML
WBC # FLD AUTO: 6.37 K/UL

## 2022-04-07 ENCOUNTER — APPOINTMENT (OUTPATIENT)
Dept: OBGYN | Facility: CLINIC | Age: 27
End: 2022-04-07
Payer: COMMERCIAL

## 2022-04-07 VITALS
HEIGHT: 64 IN | DIASTOLIC BLOOD PRESSURE: 68 MMHG | SYSTOLIC BLOOD PRESSURE: 112 MMHG | BODY MASS INDEX: 29.02 KG/M2 | WEIGHT: 170 LBS

## 2022-04-07 PROCEDURE — 99395 PREV VISIT EST AGE 18-39: CPT

## 2022-04-07 PROCEDURE — 99213 OFFICE O/P EST LOW 20 MIN: CPT | Mod: 25

## 2022-04-07 NOTE — DISCUSSION/SUMMARY
[FreeTextEntry1] : ZEFERINO MARCIAL is a 27 year G0 here for WWE, dysmenorrhea/pcos, chronic pelvic pain, thinning hair, normal exam\par - pap/hpv utd\par - SBE reviewed\par - Birth Control discussed - we decided to switch to john to help counteract testosterone better\par - spoke about nutrafol for hair thinning\par - we discussed mirena and possibly orlissa for suspected endometrisosis, tvus ordered\par - f/u after with telehalth\par

## 2022-04-07 NOTE — HISTORY OF PRESENT ILLNESS
[FreeTextEntry1] : ZEFERINO MARCIAL is a 27 year F G0 here for annual, also hair thinning, chronic pelvic pain. Dysmenorrhea has improved slightly with junel 1/20 but still have chronicpelvic pain. has not gotten worse. Not sexually active.  Menses are regular intervals. Patient was noted to have elevated DHEAS last year as well -another reason why she is on OCPs. Patient was seen by PCP 3/2022 - again testosterone level sent and totla found to be elevated but free normal but told to follow up with gyn to discuss metformin for weight loss. Lipid panel abnormal - dietary intervention and repeat in  3 months. A1c 5.1\par Denies vaginal bleeding, discharge.\par \par BC:denies\par Gynhx: Reg menses, No h/o STIs/fibroids/cysts/abn paps; PCOS/chronic pelvic pain\par Obhx:nullip\par \par \par last pap 4/5/2021 - nilm, hpv nr

## 2022-04-08 ENCOUNTER — RX RENEWAL (OUTPATIENT)
Age: 27
End: 2022-04-08

## 2022-04-18 ENCOUNTER — APPOINTMENT (OUTPATIENT)
Dept: OBGYN | Facility: CLINIC | Age: 27
End: 2022-04-18
Payer: COMMERCIAL

## 2022-04-18 ENCOUNTER — ASOB RESULT (OUTPATIENT)
Age: 27
End: 2022-04-18

## 2022-04-18 PROCEDURE — 76856 US EXAM PELVIC COMPLETE: CPT | Mod: 59

## 2022-04-18 PROCEDURE — 76830 TRANSVAGINAL US NON-OB: CPT

## 2022-04-22 ENCOUNTER — NON-APPOINTMENT (OUTPATIENT)
Age: 27
End: 2022-04-22

## 2022-04-25 ENCOUNTER — APPOINTMENT (OUTPATIENT)
Dept: OBGYN | Facility: CLINIC | Age: 27
End: 2022-04-25
Payer: COMMERCIAL

## 2022-04-25 PROCEDURE — 99441: CPT

## 2022-04-25 NOTE — HISTORY OF PRESENT ILLNESS
[FreeTextEntry1] : This is a telephonic visit. Patient consented to the visit. Patient was at her home and I was in my [place] office.\par ZEFERINO MARCIAL is a 27 year G0 here to follow up dysmenorrhea/pcos, chronic pelvic pain.\par \par SHe was switched to Lary in april to help her pcos better. She reports she hasn’t taken it yet - she just finished her last packet of Junel 1/20. Reports menses very painful. She uses tylenol for pain.\par \par tvus done 4/18 - unremarkable\par \par I advised patient to take motrin instead of tylenol\par We will f/u in 3 months to see how her pain is on lary and also motrin\par \par total time on call was 7 minutes

## 2022-06-06 ENCOUNTER — NON-APPOINTMENT (OUTPATIENT)
Age: 27
End: 2022-06-06

## 2022-06-09 ENCOUNTER — NON-APPOINTMENT (OUTPATIENT)
Age: 27
End: 2022-06-09

## 2022-06-09 ENCOUNTER — APPOINTMENT (OUTPATIENT)
Dept: OBGYN | Facility: CLINIC | Age: 27
End: 2022-06-09
Payer: COMMERCIAL

## 2022-06-09 VITALS
BODY MASS INDEX: 29.02 KG/M2 | HEIGHT: 64 IN | DIASTOLIC BLOOD PRESSURE: 82 MMHG | WEIGHT: 170 LBS | TEMPERATURE: 97.8 F | SYSTOLIC BLOOD PRESSURE: 120 MMHG

## 2022-06-09 DIAGNOSIS — R10.2 PELVIC AND PERINEAL PAIN: ICD-10-CM

## 2022-06-09 DIAGNOSIS — N76.0 ACUTE VAGINITIS: ICD-10-CM

## 2022-06-09 DIAGNOSIS — G89.29 PELVIC AND PERINEAL PAIN: ICD-10-CM

## 2022-06-09 DIAGNOSIS — Z01.419 ENCOUNTER FOR GYNECOLOGICAL EXAMINATION (GENERAL) (ROUTINE) W/OUT ABNORMAL FINDINGS: ICD-10-CM

## 2022-06-09 PROCEDURE — 99212 OFFICE O/P EST SF 10 MIN: CPT | Mod: 25

## 2022-06-09 PROCEDURE — 99395 PREV VISIT EST AGE 18-39: CPT

## 2022-06-09 NOTE — HISTORY OF PRESENT ILLNESS
[Y] : Patient uses contraception [Oral Contraceptive] : uses oral contraception pills [N] : Patient denies prior pregnancies [Menarche Age: ____] : age at menarche was [unfilled] [No] : Patient does not have concerns regarding sex [Never active] : never active [Papeardate] : 04/05/21 [TextBox_31] : NEG [GonorrheaDate] : 04/05/21 [TextBox_63] : NEG [ChlamydiaDate] : 04/05/21 [TextBox_68] : NEG [HPVDate] : 04/05/21 [TextBox_78] : NEG [LMPDate] : 05/26/22 [PGHxTotal] : 0 [FreeTextEntry1] : 05/26/22

## 2022-06-09 NOTE — DISCUSSION/SUMMARY
[FreeTextEntry1] : 27 year old G0 presents for annual gyn exam\par \par #new gyn annual\par -Pap not indicated, pt agreeable. We reviewed ASCCP/ACOG guidelines for pap smears. \par -STI testing declined\par -Patient to continue current OCPs. Denies estrogen risk factors. Rx Laya sent to her pharmacy. \par -Counselled patient on signs and sx of PCOS which she does not exhibit\par \par #Acute vaginitis\par -Affirm culture collected\par \par #Skin tags\par -Recommended follow up w/ Dermatology\par \par #hair loss\par -patient has normal tSH\par -normal cbc\par -recommend MV\par -patient should consider endocrine evaluation prn\par \par #chronic Suprapubic pain QD\par -Normal TVUS w/ previous gyn per pt\par -Normal work up w/ GI per pt\par -Discussed ddx of suprapubic pain such as IC. Referral for urogyn given. Contact information provided for Dr. Caicedo; consult ordered\par \par RTO 1 year for annual or prn

## 2022-06-09 NOTE — PHYSICAL EXAM
[Chaperone Present] : A chaperone was present in the examining room during all aspects of the physical examination [Appropriately responsive] : appropriately responsive [Alert] : alert [No Acute Distress] : no acute distress [No Lymphadenopathy] : no lymphadenopathy [Soft] : soft [Non-tender] : non-tender [Non-distended] : non-distended [No HSM] : No HSM [No Lesions] : no lesions [No Mass] : no mass [Oriented x3] : oriented x3 [Examination Of The Breasts] : a normal appearance [No Discharge] : no discharge [Breast Nipple Inversion Right] : inverted [No Masses] : no breast masses were palpable [Labia Majora] : normal [Labia Minora] : normal [Discharge] : a  ~M vaginal discharge was present [White] : white [No Bleeding] : There was no active vaginal bleeding [Normal] : normal [Uterine Adnexae] : normal [FreeTextEntry1] : ROSENDO JUAREZ [FreeTextEntry3] : Mobile thyroid, no masses, no nodules.  [FreeTextEntry6] : No cervical or axillary lymphadenopathy. Right nipple inversion-unchanged per patient [Moderate] : moderate [Cheesy] : cheesy [FreeTextEntry4] : Very NARROW suprapubic arch

## 2022-06-09 NOTE — END OF VISIT
[FreeTextEntry3] : I, Myah Lynn, solely acted as a scribe for Dr. Greta Williamson on 06/09/2022. All medical entries made by the Scribe were at my, Dr. Williamson's, direction and personally dictated by me on 06/09/2022. I have reviewed the chart and agree that the record accurately reflects my personal performance of the history, physical exam, assessment and plan. I have also personally directed, reviewed and agreed with the chart.  [Time Spent: ___ minutes] : I have spent [unfilled] minutes of time on the encounter.

## 2022-06-10 ENCOUNTER — APPOINTMENT (OUTPATIENT)
Dept: UROLOGY | Facility: CLINIC | Age: 27
End: 2022-06-10
Payer: COMMERCIAL

## 2022-06-10 VITALS
RESPIRATION RATE: 17 BRPM | DIASTOLIC BLOOD PRESSURE: 91 MMHG | SYSTOLIC BLOOD PRESSURE: 152 MMHG | WEIGHT: 170 LBS | BODY MASS INDEX: 29.02 KG/M2 | HEART RATE: 69 BPM | HEIGHT: 64 IN

## 2022-06-10 DIAGNOSIS — G89.29 PELVIC AND PERINEAL PAIN: ICD-10-CM

## 2022-06-10 DIAGNOSIS — R10.2 PELVIC AND PERINEAL PAIN: ICD-10-CM

## 2022-06-10 DIAGNOSIS — Z80.9 FAMILY HISTORY OF MALIGNANT NEOPLASM, UNSPECIFIED: ICD-10-CM

## 2022-06-10 PROCEDURE — 99204 OFFICE O/P NEW MOD 45 MIN: CPT

## 2022-06-10 NOTE — PHYSICAL EXAM
[Edema] : no peripheral edema [Respiration, Rhythm And Depth] : normal respiratory rhythm and effort [Exaggerated Use Of Accessory Muscles For Inspiration] : no accessory muscle use [Abdomen Soft] : soft [Abdomen Tenderness] : non-tender [Abdomen Mass (___ Cm)] : no abdominal mass palpated [Abdomen Hernia] : no hernia was discovered [Costovertebral Angle Tenderness] : no ~M costovertebral angle tenderness [Urethral Meatus] : normal urethra [External Female Genitalia] : normal external genitalia [Vagina] : normal vaginal exam [FreeTextEntry1] : soft urethr, ureht and bladder not tender, soft stool felt vag in rectal vault, pelvic muslces soft and NOT tender [Normal Station and Gait] : the gait and station were normal for the patient's age [] : no rash [No Focal Deficits] : no focal deficits [Oriented To Time, Place, And Person] : oriented to person, place, and time [Affect] : the affect was normal [Mood] : the mood was normal [Not Anxious] : not anxious [No Palpable Adenopathy] : no palpable adenopathy

## 2022-06-10 NOTE — ASSESSMENT
[FreeTextEntry1] : patient with hx of lower pelvic pain for one year\par denies any truama or retention of urine or uti or GYN issues causing this\par denies LUTS\par denies hemauri or dysuria \par no fever , N/V or bowel issues ( some dirrhea )\par not sexyally actrive \par , no hx of stds \par seen byGYN and eval with sono negative - thought was PCOS due to elev testosterone - but negative\par has not seen GI or Gen surgery \par referred to UROGYN for 'R/O - "'IC' and here for further eval :\par 1- jenny lcheck urine \par 2- pelvic muscle NOT tender thus PT likley not helpful\par 3- no LUTS with bladder pain thus 'IC' dx unlkely - albeit a dx of exclusion - however offered CYSTOSCOPY to eval bladder - but her pain ois not centeral but lower pelvic

## 2022-06-10 NOTE — HISTORY OF PRESENT ILLNESS
[FreeTextEntry1] : patient with hx of lower pelvic pain for one year\par denies any truama or retention of urine or uti or GYN issues causing this\par denies LUTS\par denies hemauri or dysuria \par no fever , N/V or bowel issues ( some dirrhea )\par not sexyally actrive \par , no hx of stds \par seen byGYN and eval with sono negative - thought was PCOS due to elev testosterone - but negative\par has not seen GI or Gen surgery \par referred to UROGYN for 'R/O - "'IC' and here for further eval :

## 2022-06-13 LAB
APPEARANCE: CLEAR
BACTERIA: NEGATIVE
BILIRUBIN URINE: NEGATIVE
BLOOD URINE: NEGATIVE
COLOR: COLORLESS
GLUCOSE QUALITATIVE U: NEGATIVE
HYALINE CASTS: 1 /LPF
KETONES URINE: NEGATIVE
LEUKOCYTE ESTERASE URINE: NEGATIVE
MICROSCOPIC-UA: NORMAL
NITRITE URINE: NEGATIVE
PH URINE: 6.5
PROTEIN URINE: NEGATIVE
RED BLOOD CELLS URINE: 0 /HPF
SPECIFIC GRAVITY URINE: 1
SQUAMOUS EPITHELIAL CELLS: 0 /HPF
UROBILINOGEN URINE: NORMAL
WHITE BLOOD CELLS URINE: 0 /HPF

## 2022-06-17 LAB — BACTERIA UR CULT: ABNORMAL

## 2022-06-20 LAB
CANDIDA VAG CYTO: NOT DETECTED
G VAGINALIS+PREV SP MTYP VAG QL MICRO: NOT DETECTED
T VAGINALIS VAG QL WET PREP: NOT DETECTED

## 2022-07-25 ENCOUNTER — APPOINTMENT (OUTPATIENT)
Dept: OBGYN | Facility: CLINIC | Age: 27
End: 2022-07-25

## 2022-08-19 ENCOUNTER — APPOINTMENT (OUTPATIENT)
Dept: INTERNAL MEDICINE | Facility: CLINIC | Age: 27
End: 2022-08-19

## 2022-08-19 VITALS
BODY MASS INDEX: 28.51 KG/M2 | SYSTOLIC BLOOD PRESSURE: 142 MMHG | DIASTOLIC BLOOD PRESSURE: 82 MMHG | WEIGHT: 167 LBS | HEIGHT: 64 IN | TEMPERATURE: 98.2 F | OXYGEN SATURATION: 98 % | RESPIRATION RATE: 17 BRPM | HEART RATE: 70 BPM

## 2022-08-19 DIAGNOSIS — R10.9 UNSPECIFIED ABDOMINAL PAIN: ICD-10-CM

## 2022-08-19 DIAGNOSIS — N94.6 DYSMENORRHEA, UNSPECIFIED: ICD-10-CM

## 2022-08-19 LAB
BILIRUB UR QL STRIP: NORMAL
CLARITY UR: CLEAR
COLLECTION METHOD: NORMAL
GLUCOSE UR-MCNC: NORMAL
HCG UR QL: 0.2 EU/DL
HGB UR QL STRIP.AUTO: NORMAL
KETONES UR-MCNC: NORMAL
LEUKOCYTE ESTERASE UR QL STRIP: NORMAL
NITRITE UR QL STRIP: NORMAL
PH UR STRIP: 7
PROT UR STRIP-MCNC: NORMAL
SP GR UR STRIP: 1.01

## 2022-08-19 PROCEDURE — 99214 OFFICE O/P EST MOD 30 MIN: CPT | Mod: 25

## 2022-08-19 PROCEDURE — 81003 URINALYSIS AUTO W/O SCOPE: CPT | Mod: QW

## 2022-08-19 PROCEDURE — 36415 COLL VENOUS BLD VENIPUNCTURE: CPT

## 2022-08-19 NOTE — PLAN
[FreeTextEntry1] : GYN- Dysmenorrhea -follow-up with gynecology -continue with birth control pill.\par Recheck hormonal levels and DHEA.  If remains elevated refer to endocrinology for evaluation.\par Prolactin level\par \par Psych-  anxiety - check labs - Counseling given -crease Lexapro to 15 mg daily.  Discussed side effects.  Call office in a few weeks for a follow-up.\par  \par Dermatology ZEFERINO was encouraged to wear sun protective clothing, sun block, and proper use of SPF board brim hats, to seek shade and to avoid the sun in peak hours.  ABCD of skin moles was advised. \par \par Gastroenterology- lower GI discomfort/pelvic pain referred to gastroenterology for evaluation for IBS\par \par Patient  education  - COVID-19   Counseling and education provi ded to the patient.  Advised sign and symptoms of the virus.  Advised contact precautions.  Educated patient on proper hand washing and to participate in social distancing. Had Moderna vax.  \par \par Patient is in full awareness of the plan and agrees to it.  All pt question was answered.  \par \par I spent 30  Minutes with the patient, half of which we discussed finding on physical exam  and coordinated care.  As well as reviewed my plans and follow ups. \par

## 2022-08-19 NOTE — DATA REVIEWED
[FreeTextEntry1] : Reviewed testosterone level which was elevated 3/17/2022\par Reviewed DHEA which was elevated April 5, 2021 and normal on March 18, 2022\par Patient had a normal transvaginal sonogram\par

## 2022-08-19 NOTE — PHYSICAL EXAM
[No Acute Distress] : no acute distress [Well Nourished] : well nourished [Well Developed] : well developed [Well-Appearing] : well-appearing [Normal Sclera/Conjunctiva] : normal sclera/conjunctiva [PERRL] : pupils equal round and reactive to light [EOMI] : extraocular movements intact [Normal Outer Ear/Nose] : the outer ears and nose were normal in appearance [Normal Oropharynx] : the oropharynx was normal [No JVD] : no jugular venous distention [No Lymphadenopathy] : no lymphadenopathy [Supple] : supple [Thyroid Normal, No Nodules] : the thyroid was normal and there were no nodules present [No Respiratory Distress] : no respiratory distress  [No Accessory Muscle Use] : no accessory muscle use [Clear to Auscultation] : lungs were clear to auscultation bilaterally [Normal Rate] : normal rate  [Regular Rhythm] : with a regular rhythm [Normal S1, S2] : normal S1 and S2 [No Carotid Bruits] : no carotid bruits [No Abdominal Bruit] : a ~M bruit was not heard ~T in the abdomen [No Varicosities] : no varicosities [Pedal Pulses Present] : the pedal pulses are present [No Edema] : there was no peripheral edema [No Palpable Aorta] : no palpable aorta [No Extremity Clubbing/Cyanosis] : no extremity clubbing/cyanosis [Soft] : abdomen soft [Non Tender] : non-tender [Non-distended] : non-distended [No Masses] : no abdominal mass palpated [No HSM] : no HSM [Normal Bowel Sounds] : normal bowel sounds [Normal Posterior Cervical Nodes] : no posterior cervical lymphadenopathy [Normal Anterior Cervical Nodes] : no anterior cervical lymphadenopathy [No CVA Tenderness] : no CVA  tenderness [No Spinal Tenderness] : no spinal tenderness [No Joint Swelling] : no joint swelling [Grossly Normal Strength/Tone] : grossly normal strength/tone [No Rash] : no rash [Coordination Grossly Intact] : coordination grossly intact [No Focal Deficits] : no focal deficits [Normal Gait] : normal gait [Deep Tendon Reflexes (DTR)] : deep tendon reflexes were 2+ and symmetric [Normal Affect] : the affect was normal [Normal Insight/Judgement] : insight and judgment were intact [Declined Rectal Exam] : declined rectal exam [Speech Grossly Normal] : speech grossly normal [Alert and Oriented x3] : oriented to person, place, and time [Normal Mood] : the mood was normal [de-identified] : with murmur 1/6 [de-identified] : As per gynecology

## 2022-08-19 NOTE — HISTORY OF PRESENT ILLNESS
[FreeTextEntry1] : Medication renewal and fasting blood work [de-identified] : Ms. MARCIAL is a 27 year  female, medical history as noted below, who present to the office for medication renewal and fasting blood work.  Patient states she saw her a new gynecologist and was told that she does not have PCOS.  Was sent to urology for evaluation of lower pelvic pain.  States the exam was normal and was advised to follow-up with gastroenterology.  States her menstrual cycles have been heavy but on a regular basis.\par Recently her birth control was increased and since then she has been more anxious and depressed.

## 2022-08-19 NOTE — HEALTH RISK ASSESSMENT
[Never] : Never [Yes] : Yes [2 - 4 times a month (2 pts)] : 2-4 times a month (2 points) [3 or 4 (1 pt)] : 3 or 4  (1 point) [Never (0 pts)] : Never (0 points) [No] : In the past 12 months have you used drugs other than those required for medical reasons? No [No falls in past year] : Patient reported no falls in the past year [0] : 2) Feeling down, depressed, or hopeless: Not at all (0) [PHQ-2 Negative - No further assessment needed] : PHQ-2 Negative - No further assessment needed [de-identified] : GYN [Audit-CScore] : 3 [de-identified] : 3x a week  walk daily - gym -  incline walk  [de-identified] : Regular  [LMR6Binoa] : 0

## 2022-08-19 NOTE — REVIEW OF SYSTEMS
[Fatigue] : fatigue [Dysmenorrhea] : dysmenorrhea [Anxiety] : anxiety [Negative] : Heme/Lymph [Fever] : no fever [Chills] : no chills [Hot Flashes] : no hot flashes [Night Sweats] : no night sweats [Recent Change In Weight] : ~T no recent weight change [Pain] : no pain [Vision Problems] : no vision problems [Itching] : no itching [Earache] : no earache [Nosebleed] : no nosebleeds [Hoarseness] : no hoarseness [Nasal Discharge] : no nasal discharge [Sore Throat] : no sore throat [Postnasal Drip] : no postnasal drip [Chest Pain] : no chest pain [Palpitations] : no palpitations [Leg Claudication] : no leg claudication [Lower Ext Edema] : no lower extremity edema [Shortness Of Breath] : no shortness of breath [Cough] : no cough [Dyspnea on Exertion] : no dyspnea on exertion [Abdominal Pain] : no abdominal pain [Nausea] : no nausea [Constipation] : no constipation [Diarrhea] : diarrhea [Vomiting] : no vomiting [Heartburn] : no heartburn [Melena] : no melena [Dysuria] : no dysuria [Hematuria] : no hematuria [Frequency] : no frequency [Joint Pain] : no joint pain [Joint Stiffness] : no joint stiffness [Muscle Pain] : no muscle pain [Back Pain] : no back pain [Itching] : no itching [Nail Changes] : no nail changes [Hair Changes] : no hair changes [Skin Rash] : no skin rash [Headache] : no headache [Dizziness] : no dizziness [Memory Loss] : no memory loss [Unsteady Walking] : no ataxia [Depression] : no depression [Swollen Glands] : no swollen glands

## 2022-08-19 NOTE — ASSESSMENT
[FreeTextEntry1] : Mrs. MARCIAL is a 27 year  female, who present to the office for discussion on medication for anxiety and fasting blood work

## 2022-08-22 LAB
25(OH)D3 SERPL-MCNC: 47.7 NG/ML
ALBUMIN SERPL ELPH-MCNC: 4.9 G/DL
ALP BLD-CCNC: 62 U/L
ALT SERPL-CCNC: 32 U/L
ANION GAP SERPL CALC-SCNC: 13 MMOL/L
AST SERPL-CCNC: 27 U/L
BASOPHILS # BLD AUTO: 0.02 K/UL
BASOPHILS NFR BLD AUTO: 0.5 %
BILIRUB SERPL-MCNC: 0.5 MG/DL
BUN SERPL-MCNC: 9 MG/DL
CALCIUM SERPL-MCNC: 10 MG/DL
CHLORIDE SERPL-SCNC: 100 MMOL/L
CHOLEST SERPL-MCNC: 323 MG/DL
CO2 SERPL-SCNC: 24 MMOL/L
CREAT SERPL-MCNC: 0.69 MG/DL
EGFR: 121 ML/MIN/1.73M2
EOSINOPHIL # BLD AUTO: 0.06 K/UL
EOSINOPHIL NFR BLD AUTO: 1.4 %
ESTIMATED AVERAGE GLUCOSE: 103 MG/DL
GLUCOSE SERPL-MCNC: 97 MG/DL
HBA1C MFR BLD HPLC: 5.2 %
HCT VFR BLD CALC: 38 %
HDLC SERPL-MCNC: 46 MG/DL
HGB BLD-MCNC: 12.9 G/DL
IMM GRANULOCYTES NFR BLD AUTO: 0.2 %
LDLC SERPL CALC-MCNC: 252 MG/DL
LH SERPL-ACNC: 3.9 IU/L
LYMPHOCYTES # BLD AUTO: 1.42 K/UL
LYMPHOCYTES NFR BLD AUTO: 34.1 %
MAN DIFF?: NORMAL
MCHC RBC-ENTMCNC: 32.9 PG
MCHC RBC-ENTMCNC: 33.9 GM/DL
MCV RBC AUTO: 96.9 FL
MONOCYTES # BLD AUTO: 0.33 K/UL
MONOCYTES NFR BLD AUTO: 7.9 %
NEUTROPHILS # BLD AUTO: 2.32 K/UL
NEUTROPHILS NFR BLD AUTO: 55.9 %
NONHDLC SERPL-MCNC: 276 MG/DL
PLATELET # BLD AUTO: 246 K/UL
POTASSIUM SERPL-SCNC: 4.6 MMOL/L
PROLACTIN SERPL-MCNC: 4.6 NG/ML
PROT SERPL-MCNC: 7.5 G/DL
RBC # BLD: 3.92 M/UL
RBC # FLD: 12.3 %
SODIUM SERPL-SCNC: 137 MMOL/L
TESTOST SERPL-MCNC: 34.4 NG/DL
TRIGL SERPL-MCNC: 122 MG/DL
TSH SERPL-ACNC: 0.69 UIU/ML
WBC # FLD AUTO: 4.16 K/UL

## 2022-08-23 ENCOUNTER — NON-APPOINTMENT (OUTPATIENT)
Age: 27
End: 2022-08-23

## 2022-08-28 LAB
DHEA-SULFATE, SERUM: 293 UG/DL
ESTROGEN SERPL-MCNC: 66 PG/ML

## 2022-11-09 ENCOUNTER — RX RENEWAL (OUTPATIENT)
Age: 27
End: 2022-11-09

## 2022-11-11 ENCOUNTER — RESULT REVIEW (OUTPATIENT)
Age: 27
End: 2022-11-11

## 2022-12-05 ENCOUNTER — APPOINTMENT (OUTPATIENT)
Dept: INTERNAL MEDICINE | Facility: CLINIC | Age: 27
End: 2022-12-05

## 2022-12-05 VITALS
DIASTOLIC BLOOD PRESSURE: 84 MMHG | HEIGHT: 64 IN | SYSTOLIC BLOOD PRESSURE: 132 MMHG | WEIGHT: 167 LBS | OXYGEN SATURATION: 99 % | BODY MASS INDEX: 28.51 KG/M2 | TEMPERATURE: 97.3 F | RESPIRATION RATE: 16 BRPM | HEART RATE: 64 BPM

## 2022-12-05 DIAGNOSIS — R14.0 ABDOMINAL DISTENSION (GASEOUS): ICD-10-CM

## 2022-12-05 PROCEDURE — 99214 OFFICE O/P EST MOD 30 MIN: CPT | Mod: 25

## 2022-12-05 RX ORDER — NORETHINDRONE ACETATE AND ETHINYL ESTRADIOL AND FERROUS FUMARATE 1MG-20(21)
1-20 KIT ORAL
Qty: 84 | Refills: 0 | Status: DISCONTINUED | COMMUNITY
Start: 2021-04-15 | End: 2022-12-05

## 2022-12-05 NOTE — PAST MEDICAL HISTORY
[Menstruating] : menstruating [Regular Cycle Intervals] : have been regular [Total Preg ___] : G[unfilled] [Definite ___ (Date)] : the last menstrual period was [unfilled]

## 2022-12-06 PROBLEM — R14.0 BLOATED ABDOMEN: Status: ACTIVE | Noted: 2021-11-23

## 2022-12-06 NOTE — DATA REVIEWED
[FreeTextEntry1] : Reviewed prior labs.  Reviewed recent testosterone level as well as recent lipid panel with the patient in full detail compared to prior lipid panel with current lipid panel

## 2022-12-06 NOTE — HEALTH RISK ASSESSMENT
[Never] : Never [Yes] : Yes [2 - 4 times a month (2 pts)] : 2-4 times a month (2 points) [3 or 4 (1 pt)] : 3 or 4  (1 point) [Never (0 pts)] : Never (0 points) [No] : In the past 12 months have you used drugs other than those required for medical reasons? No [No falls in past year] : Patient reported no falls in the past year [0] : 2) Feeling down, depressed, or hopeless: Not at all (0) [PHQ-2 Negative - No further assessment needed] : PHQ-2 Negative - No further assessment needed [de-identified] : GYN, gastroenterology [Audit-CScore] : 3 [de-identified] : 3x a week  walk daily - gym -  incline walk  [de-identified] : Regular  [[id number]] : 0

## 2022-12-06 NOTE — HISTORY OF PRESENT ILLNESS
[FreeTextEntry1] : Follow-up on abnormal blood [de-identified] : Ms. MARCIAL is a 27 year  female, medical history as noted below, who present to the office for Follow-up on abnormal blood work.  Patient was advised to return to office for evaluation of elevated lipid panel.  Overall patient feels well has no news chief complaint since last office visit.  States she has a family history of hyperlipidemia.  \par Recently followed up with gastroenterology Started on Bentyl secondary to increased flatulence/bloatedness

## 2022-12-06 NOTE — PLAN
[FreeTextEntry1] : Endocrinology -hyperlipidemia more likely familial hyperlipidemia secondary to the severely elevated LDL.  Long discussion with the patient regarding hyperlipidemia and being in childbearing ages.  Shared decision making was made and will start statin therapy.  If she decides that she wants to become pregnant is aware that she needs to come off the medication prior to becoming pregnant.   Check liver function test in 6 weeks.  Start Crestor 10 mg daily.  Advised patient to take Crestor 10 mg nightly.  Discussed side effects of the current medication.  Check lipid panel in 3 months. \par \par  GYN- Dysmenorrhea -follow-up with gynecology -continue with birth control pill.\par Continue to monitor DHEA and testosterone levels.\par \par Psych-  anxiety - check labs - Counseling given -continue with Lexapro to 15 mg daily.  Discussed side effects.  Call office in a few weeks for a follow-up.\par  \par Dermatology ZEFERINO was encouraged to wear sun protective clothing, sun block, and proper use of SPF board brim hats, to seek shade and to avoid the sun in peak hours.  ABCD of skin moles was advised. \par \par Gastroenterology -follow-up with gastroenterologist.  Continue with Bentyl \par \par Patient  education  - COVID-19   Counseling and education provi ded to the patient.  Advised sign and symptoms of the virus.  Advised contact precautions.  Educated patient on proper hand washing and to participate in social distancing. Had Moderna vax.  \par \par Patient is in full awareness of the plan and agrees to it.  All pt question was answered.  \par \par I spent 30  Minutes with the patient, half of which we discussed finding on physical exam  and coordinated care.  As well as reviewed my plans and follow ups. \par

## 2022-12-06 NOTE — REVIEW OF SYSTEMS
[Dysmenorrhea] : dysmenorrhea [Anxiety] : anxiety [Negative] : Heme/Lymph [Fever] : no fever [Chills] : no chills [Fatigue] : no fatigue [Hot Flashes] : no hot flashes [Night Sweats] : no night sweats [Recent Change In Weight] : ~T no recent weight change [Pain] : no pain [Vision Problems] : no vision problems [Itching] : no itching [Earache] : no earache [Nosebleed] : no nosebleeds [Hoarseness] : no hoarseness [Nasal Discharge] : no nasal discharge [Sore Throat] : no sore throat [Postnasal Drip] : no postnasal drip [Chest Pain] : no chest pain [Palpitations] : no palpitations [Leg Claudication] : no leg claudication [Lower Ext Edema] : no lower extremity edema [Shortness Of Breath] : no shortness of breath [Cough] : no cough [Dyspnea on Exertion] : no dyspnea on exertion [Abdominal Pain] : no abdominal pain [Nausea] : no nausea [Constipation] : no constipation [Diarrhea] : diarrhea [Vomiting] : no vomiting [Heartburn] : no heartburn [Melena] : no melena [Dysuria] : no dysuria [Hematuria] : no hematuria [Frequency] : no frequency [Joint Pain] : no joint pain [Joint Stiffness] : no joint stiffness [Muscle Pain] : no muscle pain [Back Pain] : no back pain [Itching] : no itching [Nail Changes] : no nail changes [Hair Changes] : no hair changes [Skin Rash] : no skin rash [Headache] : no headache [Dizziness] : no dizziness [Memory Loss] : no memory loss [Unsteady Walking] : no ataxia [Depression] : no depression [Swollen Glands] : no swollen glands

## 2022-12-06 NOTE — ASSESSMENT
[FreeTextEntry1] : Mrs. MARCIAL is a 27 year  female, who present to the office for discussion on medication hyperlipidemia

## 2022-12-06 NOTE — PHYSICAL EXAM
[No Acute Distress] : no acute distress [Well Nourished] : well nourished [Well Developed] : well developed [Well-Appearing] : well-appearing [Normal Sclera/Conjunctiva] : normal sclera/conjunctiva [PERRL] : pupils equal round and reactive to light [EOMI] : extraocular movements intact [Normal Outer Ear/Nose] : the outer ears and nose were normal in appearance [Normal Oropharynx] : the oropharynx was normal [No JVD] : no jugular venous distention [No Lymphadenopathy] : no lymphadenopathy [Supple] : supple [Thyroid Normal, No Nodules] : the thyroid was normal and there were no nodules present [No Respiratory Distress] : no respiratory distress  [No Accessory Muscle Use] : no accessory muscle use [Clear to Auscultation] : lungs were clear to auscultation bilaterally [Normal Rate] : normal rate  [Regular Rhythm] : with a regular rhythm [Normal S1, S2] : normal S1 and S2 [No Carotid Bruits] : no carotid bruits [No Abdominal Bruit] : a ~M bruit was not heard ~T in the abdomen [No Varicosities] : no varicosities [Pedal Pulses Present] : the pedal pulses are present [No Edema] : there was no peripheral edema [No Palpable Aorta] : no palpable aorta [No Extremity Clubbing/Cyanosis] : no extremity clubbing/cyanosis [Soft] : abdomen soft [Non Tender] : non-tender [Non-distended] : non-distended [No Masses] : no abdominal mass palpated [No HSM] : no HSM [Normal Bowel Sounds] : normal bowel sounds [Declined Rectal Exam] : declined rectal exam [Normal Posterior Cervical Nodes] : no posterior cervical lymphadenopathy [Normal Anterior Cervical Nodes] : no anterior cervical lymphadenopathy [No CVA Tenderness] : no CVA  tenderness [No Spinal Tenderness] : no spinal tenderness [No Joint Swelling] : no joint swelling [Grossly Normal Strength/Tone] : grossly normal strength/tone [No Rash] : no rash [Coordination Grossly Intact] : coordination grossly intact [No Focal Deficits] : no focal deficits [Normal Gait] : normal gait [Deep Tendon Reflexes (DTR)] : deep tendon reflexes were 2+ and symmetric [Speech Grossly Normal] : speech grossly normal [Normal Affect] : the affect was normal [Alert and Oriented x3] : oriented to person, place, and time [Normal Mood] : the mood was normal [Normal Insight/Judgement] : insight and judgment were intact [de-identified] : with murmur 1/6 [de-identified] : As per gynecology [FreeTextEntry1] : As per gynecology [de-identified] : As per gynecology

## 2023-01-17 ENCOUNTER — LABORATORY RESULT (OUTPATIENT)
Age: 28
End: 2023-01-17

## 2023-01-17 ENCOUNTER — APPOINTMENT (OUTPATIENT)
Dept: INTERNAL MEDICINE | Facility: CLINIC | Age: 28
End: 2023-01-17
Payer: COMMERCIAL

## 2023-01-17 VITALS
SYSTOLIC BLOOD PRESSURE: 118 MMHG | TEMPERATURE: 98 F | OXYGEN SATURATION: 99 % | HEIGHT: 64 IN | DIASTOLIC BLOOD PRESSURE: 80 MMHG | HEART RATE: 107 BPM | RESPIRATION RATE: 15 BRPM | BODY MASS INDEX: 28.17 KG/M2 | WEIGHT: 165 LBS

## 2023-01-17 PROCEDURE — 99214 OFFICE O/P EST MOD 30 MIN: CPT | Mod: 25

## 2023-01-17 PROCEDURE — 36415 COLL VENOUS BLD VENIPUNCTURE: CPT

## 2023-01-18 NOTE — HISTORY OF PRESENT ILLNESS
[FreeTextEntry1] : Medication renewal, fasting labs  [de-identified] : Ms. MARCIAL is a 28 year  female, medical history as noted below, [facility].  Taking Crestor on a daily basis  w/o side effects.  Patient denies pregnancy.  Gi bloating has improved as well as her anxiety

## 2023-01-18 NOTE — PHYSICAL EXAM
[No Acute Distress] : no acute distress [Well Nourished] : well nourished [Well Developed] : well developed [Well-Appearing] : well-appearing [Normal Sclera/Conjunctiva] : normal sclera/conjunctiva [PERRL] : pupils equal round and reactive to light [EOMI] : extraocular movements intact [Normal Outer Ear/Nose] : the outer ears and nose were normal in appearance [Normal Oropharynx] : the oropharynx was normal [No JVD] : no jugular venous distention [No Lymphadenopathy] : no lymphadenopathy [Supple] : supple [Thyroid Normal, No Nodules] : the thyroid was normal and there were no nodules present [No Respiratory Distress] : no respiratory distress  [No Accessory Muscle Use] : no accessory muscle use [Clear to Auscultation] : lungs were clear to auscultation bilaterally [Normal Rate] : normal rate  [Regular Rhythm] : with a regular rhythm [Normal S1, S2] : normal S1 and S2 [No Carotid Bruits] : no carotid bruits [No Abdominal Bruit] : a ~M bruit was not heard ~T in the abdomen [No Varicosities] : no varicosities [Pedal Pulses Present] : the pedal pulses are present [No Edema] : there was no peripheral edema [No Palpable Aorta] : no palpable aorta [No Extremity Clubbing/Cyanosis] : no extremity clubbing/cyanosis [Soft] : abdomen soft [Non Tender] : non-tender [Non-distended] : non-distended [No Masses] : no abdominal mass palpated [No HSM] : no HSM [Normal Bowel Sounds] : normal bowel sounds [Declined Rectal Exam] : declined rectal exam [Normal Posterior Cervical Nodes] : no posterior cervical lymphadenopathy [Normal Anterior Cervical Nodes] : no anterior cervical lymphadenopathy [No CVA Tenderness] : no CVA  tenderness [No Spinal Tenderness] : no spinal tenderness [No Joint Swelling] : no joint swelling [Grossly Normal Strength/Tone] : grossly normal strength/tone [No Rash] : no rash [Coordination Grossly Intact] : coordination grossly intact [No Focal Deficits] : no focal deficits [Normal Gait] : normal gait [Deep Tendon Reflexes (DTR)] : deep tendon reflexes were 2+ and symmetric [Speech Grossly Normal] : speech grossly normal [Normal Affect] : the affect was normal [Alert and Oriented x3] : oriented to person, place, and time [Normal Mood] : the mood was normal [Normal Insight/Judgement] : insight and judgment were intact [de-identified] : with murmur 1/6 [de-identified] : As per gynecology [FreeTextEntry1] : As per gynecology [de-identified] : As per gynecology

## 2023-01-18 NOTE — REVIEW OF SYSTEMS
[Dysmenorrhea] : dysmenorrhea [Anxiety] : anxiety [Negative] : Heme/Lymph [Fever] : no fever [Chills] : no chills [Fatigue] : no fatigue [Hot Flashes] : no hot flashes [Night Sweats] : night sweats [Recent Change In Weight] : ~T no recent weight change [Pain] : no pain [Vision Problems] : no vision problems [Itching] : no itching [Earache] : no earache [Nosebleed] : no nosebleeds [Hoarseness] : no hoarseness [Nasal Discharge] : no nasal discharge [Sore Throat] : no sore throat [Postnasal Drip] : no postnasal drip [Chest Pain] : no chest pain [Palpitations] : no palpitations [Leg Claudication] : no leg claudication [Lower Ext Edema] : no lower extremity edema [Shortness Of Breath] : no shortness of breath [Cough] : no cough [Dyspnea on Exertion] : no dyspnea on exertion [Abdominal Pain] : no abdominal pain [Nausea] : no nausea [Constipation] : no constipation [Diarrhea] : diarrhea [Vomiting] : no vomiting [Heartburn] : no heartburn [Melena] : no melena [Dysuria] : no dysuria [Hematuria] : no hematuria [Frequency] : no frequency [Joint Pain] : no joint pain [Joint Stiffness] : no joint stiffness [Muscle Pain] : no muscle pain [Back Pain] : no back pain [Itching] : no itching [Nail Changes] : no nail changes [Hair Changes] : no hair changes [Skin Rash] : no skin rash [Headache] : no headache [Dizziness] : no dizziness [Memory Loss] : no memory loss [Unsteady Walking] : no ataxia [Depression] : no depression [Swollen Glands] : no swollen glands

## 2023-01-18 NOTE — HEALTH RISK ASSESSMENT
[Never] : Never [Yes] : Yes [2 - 4 times a month (2 pts)] : 2-4 times a month (2 points) [3 or 4 (1 pt)] : 3 or 4  (1 point) [Never (0 pts)] : Never (0 points) [No] : In the past 12 months have you used drugs other than those required for medical reasons? No [No falls in past year] : Patient reported no falls in the past year [0] : 2) Feeling down, depressed, or hopeless: Not at all (0) [PHQ-2 Negative - No further assessment needed] : PHQ-2 Negative - No further assessment needed [de-identified] : GYN, gastroenterology [Audit-CScore] : 3 [de-identified] : 3x a week  walk daily - gym -  incline walk  [de-identified] : Regular  [[id number]] : 0

## 2023-01-18 NOTE — COUNSELING
[AUDIT-C Screening administered and reviewed] : AUDIT-C Screening administered and reviewed [Encouraged to increase physical activity] : Encouraged to increase physical activity [Good understanding] : Patient has a good understanding of disease, goals and obesity follow-up plan [FreeTextEntry2] : low fat/ cholesterol

## 2023-01-18 NOTE — PLAN
[FreeTextEntry1] : Endocrinology -hyperlipidemia more likely familial hyperlipidemia secondary to the severely elevated LDL.  Continue with Crestor 10 mg daily.   Discussed side effects of the current medication.  Check lipid panel in 3 months. Again advised about pregnancy \par \par GYN- Dysmenorrhea -follow-up with gynecology -continue with birth control pill.\par \par Psych-  anxiety - check labs - Counseling given - Trial of decreasing Lexapro back to 10 mg to see how she does as well as to  see if night sweats resolve.  Check TSH \par  \par Dermatology ZEFERINO was encouraged to wear sun protective clothing, sun block, and proper use of SPF board brim hats, to seek shade and to avoid the sun in peak hours.  ABCD of skin moles was advised. \par \par Gastroenterology -follow-up with gastroenterologist.  Continue with Bentyl \par \par Patient  education  - COVID-19   Counseling and education provi ded to the patient.  Advised sign and symptoms of the virus.  Advised contact precautions.  Educated patient on proper hand washing and to participate in social distancing. Had Moderna vax.  \par \par Patient is in full awareness of the plan and agrees to it.  All pt question was answered.  \par \par I spent 30 Minutes with the patient, half of which we discussed finding on physical exam  and coordinated care.  As well as reviewed my plans and follow ups.\par Dragon speech recognition software was used to create portions of this document.  An attempt at proofreading has been made to minimize errors please call if any questions arise. \par

## 2023-01-18 NOTE — ASSESSMENT
[FreeTextEntry1] : Mrs. MARCIAL is a 28 year  female, who present to the office for fasting labs and a follow up on hyperlipidemia and JULIA

## 2023-01-20 LAB
ALBUMIN SERPL ELPH-MCNC: 4.4 G/DL
ALP BLD-CCNC: 54 U/L
ALT SERPL-CCNC: 18 U/L
ANION GAP SERPL CALC-SCNC: 14 MMOL/L
AST SERPL-CCNC: 16 U/L
BASOPHILS # BLD AUTO: 0.01 K/UL
BASOPHILS NFR BLD AUTO: 0.1 %
BILIRUB SERPL-MCNC: 0.7 MG/DL
BUN SERPL-MCNC: 18 MG/DL
CALCIUM SERPL-MCNC: 9.3 MG/DL
CHLORIDE SERPL-SCNC: 100 MMOL/L
CHOLEST SERPL-MCNC: 199 MG/DL
CO2 SERPL-SCNC: 23 MMOL/L
CREAT SERPL-MCNC: 0.77 MG/DL
EGFR: 107 ML/MIN/1.73M2
EOSINOPHIL # BLD AUTO: 0.04 K/UL
EOSINOPHIL NFR BLD AUTO: 0.6 %
GLUCOSE SERPL-MCNC: 102 MG/DL
HCT VFR BLD CALC: 40.1 %
HDLC SERPL-MCNC: 74 MG/DL
HGB BLD-MCNC: 13.9 G/DL
IMM GRANULOCYTES NFR BLD AUTO: 0.3 %
LDLC SERPL CALC-MCNC: 102 MG/DL
LYMPHOCYTES # BLD AUTO: 0.45 K/UL
LYMPHOCYTES NFR BLD AUTO: 6.3 %
MAN DIFF?: NORMAL
MCHC RBC-ENTMCNC: 33.3 PG
MCHC RBC-ENTMCNC: 34.7 GM/DL
MCV RBC AUTO: 96.2 FL
MONOCYTES # BLD AUTO: 0.42 K/UL
MONOCYTES NFR BLD AUTO: 5.9 %
NEUTROPHILS # BLD AUTO: 6.19 K/UL
NEUTROPHILS NFR BLD AUTO: 86.8 %
NONHDLC SERPL-MCNC: 125 MG/DL
PLATELET # BLD AUTO: 199 K/UL
POTASSIUM SERPL-SCNC: 4.6 MMOL/L
PROT SERPL-MCNC: 7.2 G/DL
RBC # BLD: 4.17 M/UL
RBC # FLD: 12.4 %
SODIUM SERPL-SCNC: 136 MMOL/L
T4 FREE SERPL-MCNC: 1 NG/DL
TRIGL SERPL-MCNC: 115 MG/DL
TSH SERPL-ACNC: 0.87 UIU/ML
WBC # FLD AUTO: 7.13 K/UL

## 2023-01-23 ENCOUNTER — NON-APPOINTMENT (OUTPATIENT)
Age: 28
End: 2023-01-23

## 2023-02-22 ENCOUNTER — RX RENEWAL (OUTPATIENT)
Age: 28
End: 2023-02-22

## 2023-05-09 ENCOUNTER — APPOINTMENT (OUTPATIENT)
Dept: INTERNAL MEDICINE | Facility: CLINIC | Age: 28
End: 2023-05-09
Payer: COMMERCIAL

## 2023-05-09 ENCOUNTER — LABORATORY RESULT (OUTPATIENT)
Age: 28
End: 2023-05-09

## 2023-05-09 VITALS
HEART RATE: 68 BPM | SYSTOLIC BLOOD PRESSURE: 124 MMHG | WEIGHT: 165 LBS | RESPIRATION RATE: 15 BRPM | OXYGEN SATURATION: 98 % | TEMPERATURE: 98.3 F | DIASTOLIC BLOOD PRESSURE: 76 MMHG | BODY MASS INDEX: 28.17 KG/M2 | HEIGHT: 64 IN

## 2023-05-09 DIAGNOSIS — Z87.448 PERSONAL HISTORY OF OTHER DISEASES OF URINARY SYSTEM: ICD-10-CM

## 2023-05-09 PROCEDURE — 99213 OFFICE O/P EST LOW 20 MIN: CPT | Mod: 25

## 2023-05-09 PROCEDURE — 36415 COLL VENOUS BLD VENIPUNCTURE: CPT

## 2023-05-09 NOTE — HISTORY OF PRESENT ILLNESS
[FreeTextEntry1] : Medication renewal and a general check up [de-identified] : Ms. MARCIAL is a 28 year  female, with a past medical history as noted below,who present to the office  today for Medication renewal and a general checkup.  Patient states she is requesting a renewal for escitalopram 10 mg one half tabs daily.  States she is doing very well on her current medication regimen and dosage.  Only side effect is at times she develops night sweats but is able to tolerate it.  states the night sweats also started when she started her new birth control pills so not sure if it is the escitalopram with her PCP\par  Denies pregnancy on birth control pills\par

## 2023-05-09 NOTE — PHYSICAL EXAM
[No Acute Distress] : no acute distress [Well Nourished] : well nourished [Well Developed] : well developed [Well-Appearing] : well-appearing [Normal Sclera/Conjunctiva] : normal sclera/conjunctiva [PERRL] : pupils equal round and reactive to light [EOMI] : extraocular movements intact [Normal Outer Ear/Nose] : the outer ears and nose were normal in appearance [Normal Oropharynx] : the oropharynx was normal [No JVD] : no jugular venous distention [No Lymphadenopathy] : no lymphadenopathy [Supple] : supple [Thyroid Normal, No Nodules] : the thyroid was normal and there were no nodules present [No Respiratory Distress] : no respiratory distress  [No Accessory Muscle Use] : no accessory muscle use [Clear to Auscultation] : lungs were clear to auscultation bilaterally [Normal Rate] : normal rate  [Regular Rhythm] : with a regular rhythm [Normal S1, S2] : normal S1 and S2 [No Carotid Bruits] : no carotid bruits [No Abdominal Bruit] : a ~M bruit was not heard ~T in the abdomen [No Varicosities] : no varicosities [Pedal Pulses Present] : the pedal pulses are present [No Edema] : there was no peripheral edema [No Palpable Aorta] : no palpable aorta [No Extremity Clubbing/Cyanosis] : no extremity clubbing/cyanosis [Soft] : abdomen soft [Non Tender] : non-tender [Non-distended] : non-distended [No Masses] : no abdominal mass palpated [No HSM] : no HSM [Normal Bowel Sounds] : normal bowel sounds [Declined Rectal Exam] : declined rectal exam [No CVA Tenderness] : no CVA  tenderness [No Spinal Tenderness] : no spinal tenderness [No Joint Swelling] : no joint swelling [Grossly Normal Strength/Tone] : grossly normal strength/tone [No Rash] : no rash [Coordination Grossly Intact] : coordination grossly intact [No Focal Deficits] : no focal deficits [Normal Gait] : normal gait [Deep Tendon Reflexes (DTR)] : deep tendon reflexes were 2+ and symmetric [Speech Grossly Normal] : speech grossly normal [Normal Affect] : the affect was normal [Alert and Oriented x3] : oriented to person, place, and time [Normal Mood] : the mood was normal [Normal Insight/Judgement] : insight and judgment were intact [de-identified] : with murmur 1/6 [de-identified] : As per gynecology [FreeTextEntry1] : As per gynecology [de-identified] : As per gynecology

## 2023-05-09 NOTE — REVIEW OF SYSTEMS
[Night Sweats] : night sweats [Dysmenorrhea] : dysmenorrhea [Anxiety] : anxiety [Negative] : Heme/Lymph [Fever] : no fever [Chills] : no chills [Fatigue] : no fatigue [Hot Flashes] : no hot flashes [Recent Change In Weight] : ~T no recent weight change [Pain] : no pain [Vision Problems] : no vision problems [Itching] : no itching [Earache] : no earache [Nosebleed] : no nosebleeds [Hoarseness] : no hoarseness [Nasal Discharge] : no nasal discharge [Sore Throat] : no sore throat [Postnasal Drip] : no postnasal drip [Chest Pain] : no chest pain [Palpitations] : no palpitations [Leg Claudication] : no leg claudication [Lower Ext Edema] : no lower extremity edema [Shortness Of Breath] : no shortness of breath [Cough] : no cough [Dyspnea on Exertion] : no dyspnea on exertion [Abdominal Pain] : no abdominal pain [Nausea] : no nausea [Constipation] : no constipation [Diarrhea] : diarrhea [Vomiting] : no vomiting [Heartburn] : no heartburn [Melena] : no melena [Dysuria] : no dysuria [Hematuria] : no hematuria [Frequency] : no frequency [Joint Pain] : no joint pain [Joint Stiffness] : no joint stiffness [Muscle Pain] : no muscle pain [Back Pain] : no back pain [Itching] : no itching [Nail Changes] : no nail changes [Hair Changes] : no hair changes [Skin Rash] : no skin rash [Headache] : no headache [Dizziness] : no dizziness [Memory Loss] : no memory loss [Unsteady Walking] : no ataxia [Depression] : no depression [Swollen Glands] : no swollen glands

## 2023-05-09 NOTE — HEALTH RISK ASSESSMENT
[Yes] : Yes [2 - 4 times a month (2 pts)] : 2-4 times a month (2 points) [3 or 4 (1 pt)] : 3 or 4  (1 point) [Never (0 pts)] : Never (0 points) [No] : In the past 12 months have you used drugs other than those required for medical reasons? No [No falls in past year] : Patient reported no falls in the past year [0] : 2) Feeling down, depressed, or hopeless: Not at all (0) [PHQ-2 Negative - No further assessment needed] : PHQ-2 Negative - No further assessment needed [Never] : Never [de-identified] : GYN, gastroenterology [Audit-CScore] : 3 [de-identified] : 3x a week  walk daily - gym -  incline walk  [de-identified] : Regular  [[id number]] : 0

## 2023-05-09 NOTE — PLAN
[FreeTextEntry1] : Endocrinology -hyperlipidemia more likely familial hyperlipidemia secondary to the severely elevated LDL.  Continue with Crestor 10 mg daily.   Discussed side effects of the current medication.  Check lipid panel. Again advised about pregnancy \par \par GYN- Dysmenorrhea -follow-up with gynecology -continue with birth control pill.     Advised patient also to discuss abdominal bloating with her gynecologist    Symptoms related to her menstrual cycle.  Also discussed night sweats being on the birth control pills.\par \par Psych-  anxiety - check labs - Counseling given -  continue with escitalopram 15 mg daily counseling given to the patient.  Check labs today.\par  \par Dermatology ZEFERINO was encouraged to wear sun protective clothing, sun block, and proper use of SPF board brim hats, to seek shade and to avoid the sun in peak hours.  ABCD of skin moles was advised. \par \par Gastroenterology -follow-up with gastroenterologist.  Continue with Bentyl \par \par Patient  education  - COVID-19   Counseling and education provi ded to the patient.  Advised sign and symptoms of the virus.  Advised contact precautions.  Educated patient on proper hand washing and to participate in social distancing. Had Moderna vax.  \par \par Patient is in full awareness of the plan and agrees to it.  All pt question was answered.  \par \par I spent 25  Minutes with the patient, half of which we discussed finding on physical exam  and coordinated care.  As well as reviewed my plans and follow ups.\par Dragon speech recognition software was used to create portions of this document.  An attempt at proofreading has been made to minimize errors please call if any questions arise. \par

## 2023-05-12 ENCOUNTER — NON-APPOINTMENT (OUTPATIENT)
Age: 28
End: 2023-05-12

## 2023-05-12 LAB
ALBUMIN SERPL ELPH-MCNC: 4.9 G/DL
ALP BLD-CCNC: 59 U/L
ALT SERPL-CCNC: 19 U/L
ANION GAP SERPL CALC-SCNC: 13 MMOL/L
APPEARANCE: CLEAR
AST SERPL-CCNC: 21 U/L
BASOPHILS # BLD AUTO: 0.02 K/UL
BASOPHILS NFR BLD AUTO: 0.3 %
BILIRUB SERPL-MCNC: 0.4 MG/DL
BILIRUBIN URINE: NEGATIVE
BLOOD URINE: ABNORMAL
BUN SERPL-MCNC: 9 MG/DL
CALCIUM SERPL-MCNC: 10.1 MG/DL
CHLORIDE SERPL-SCNC: 101 MMOL/L
CHOLEST SERPL-MCNC: 199 MG/DL
CO2 SERPL-SCNC: 23 MMOL/L
COLOR: YELLOW
CREAT SERPL-MCNC: 0.63 MG/DL
EGFR: 123 ML/MIN/1.73M2
EOSINOPHIL # BLD AUTO: 0.05 K/UL
EOSINOPHIL NFR BLD AUTO: 0.8 %
GLUCOSE QUALITATIVE U: NEGATIVE MG/DL
GLUCOSE SERPL-MCNC: 89 MG/DL
HCT VFR BLD CALC: 37.2 %
HDLC SERPL-MCNC: 66 MG/DL
HGB BLD-MCNC: 12.3 G/DL
IMM GRANULOCYTES NFR BLD AUTO: 0.2 %
KETONES URINE: NEGATIVE MG/DL
LDLC SERPL CALC-MCNC: 110 MG/DL
LEUKOCYTE ESTERASE URINE: NEGATIVE
LYMPHOCYTES # BLD AUTO: 2.11 K/UL
LYMPHOCYTES NFR BLD AUTO: 33.8 %
MAN DIFF?: NORMAL
MCHC RBC-ENTMCNC: 31.7 PG
MCHC RBC-ENTMCNC: 33.1 GM/DL
MCV RBC AUTO: 95.9 FL
MONOCYTES # BLD AUTO: 0.36 K/UL
MONOCYTES NFR BLD AUTO: 5.8 %
NEUTROPHILS # BLD AUTO: 3.7 K/UL
NEUTROPHILS NFR BLD AUTO: 59.1 %
NITRITE URINE: NEGATIVE
NONHDLC SERPL-MCNC: 133 MG/DL
PH URINE: 6.5
PLATELET # BLD AUTO: 185 K/UL
POTASSIUM SERPL-SCNC: 4.4 MMOL/L
PROT SERPL-MCNC: 7.3 G/DL
PROTEIN URINE: NEGATIVE MG/DL
RBC # BLD: 3.88 M/UL
RBC # FLD: 12.8 %
SODIUM SERPL-SCNC: 137 MMOL/L
SPECIFIC GRAVITY URINE: 1.01
T4 FREE SERPL-MCNC: 1.1 NG/DL
TRIGL SERPL-MCNC: 116 MG/DL
TSH SERPL-ACNC: 0.96 UIU/ML
UROBILINOGEN URINE: 0.2 MG/DL
WBC # FLD AUTO: 6.25 K/UL

## 2023-06-01 ENCOUNTER — NON-APPOINTMENT (OUTPATIENT)
Age: 28
End: 2023-06-01

## 2023-06-13 ENCOUNTER — NON-APPOINTMENT (OUTPATIENT)
Age: 28
End: 2023-06-13

## 2023-06-19 ENCOUNTER — RX RENEWAL (OUTPATIENT)
Age: 28
End: 2023-06-19

## 2023-06-20 ENCOUNTER — APPOINTMENT (OUTPATIENT)
Dept: OBGYN | Facility: CLINIC | Age: 28
End: 2023-06-20
Payer: COMMERCIAL

## 2023-06-20 VITALS
DIASTOLIC BLOOD PRESSURE: 72 MMHG | BODY MASS INDEX: 28.68 KG/M2 | WEIGHT: 168 LBS | SYSTOLIC BLOOD PRESSURE: 142 MMHG | HEIGHT: 64 IN

## 2023-06-20 VITALS — SYSTOLIC BLOOD PRESSURE: 120 MMHG | DIASTOLIC BLOOD PRESSURE: 64 MMHG

## 2023-06-20 DIAGNOSIS — Z30.09 ENCOUNTER FOR OTHER GENERAL COUNSELING AND ADVICE ON CONTRACEPTION: ICD-10-CM

## 2023-06-20 DIAGNOSIS — Z32.02 ENCOUNTER FOR PREGNANCY TEST, RESULT NEGATIVE: ICD-10-CM

## 2023-06-20 DIAGNOSIS — Z01.419 ENCOUNTER FOR GYNECOLOGICAL EXAMINATION (GENERAL) (ROUTINE) W/OUT ABNORMAL FINDINGS: ICD-10-CM

## 2023-06-20 LAB
HCG UR QL: NEGATIVE
QUALITY CONTROL: YES

## 2023-06-20 PROCEDURE — 81025 URINE PREGNANCY TEST: CPT

## 2023-06-20 PROCEDURE — 99395 PREV VISIT EST AGE 18-39: CPT

## 2023-06-20 NOTE — PHYSICAL EXAM
[Chaperone Present] : A chaperone was present in the examining room during all aspects of the physical examination [Appropriately responsive] : appropriately responsive [Alert] : alert [No Acute Distress] : no acute distress [FreeTextEntry1] : boy [No Lymphadenopathy] : no lymphadenopathy [Soft] : soft [Non-tender] : non-tender [Non-distended] : non-distended [Oriented x3] : oriented x3 [Examination Of The Breasts] : a normal appearance [No Discharge] : no discharge [No Masses] : no breast masses were palpable [Labia Majora] : normal [Labia Minora] : normal [No Bleeding] : There was no active vaginal bleeding [Normal] : normal [Uterine Adnexae] : non-palpable

## 2023-06-20 NOTE — HISTORY OF PRESENT ILLNESS
[No] : Patient does not have concerns regarding sex [Never active] : never active [FreeTextEntry1] : Liliam is a  LMP 22 who presents for annual exam. She is without complaints. Denies pelvic pain, abnormal bleeding, or vaginal discharge. Denies issues with bowel or bladder function. \par She has never been sexually active, uses OCPs kris to counteract elevated testosterone. \par Lives with family. 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 Feels safe at home. Denies depression/abuse. \par Immunizations: believes that she received gardasil\par She is not sexually active but did have a pap smear 2 years ago that was negative. \par BP today 142/72--patient reports some anxiety. Bp was repeated and was normal.

## 2023-06-20 NOTE — DISCUSSION/SUMMARY
[FreeTextEntry1] :   The benefits of adequate calcium intake and a daily multivitamin along with routine daily cardiovascular exercise were reviewed with the patient.\par   The patient is doing well on OCPs, we discussed periodically skipping menses with pill to see if this helps with some bloating symptoms she has been having. OCPs renewed. \par   The importance of safer-sex was discussed with the patient.\par We reviewed ASCCP/ACOG guidelines for pap smears. Does not need as she has never been sexually active.

## 2023-07-26 ENCOUNTER — NON-APPOINTMENT (OUTPATIENT)
Age: 28
End: 2023-07-26

## 2023-07-26 PROBLEM — L81.4 LENTIGINES: Status: ACTIVE | Noted: 2021-09-16

## 2023-07-26 PROBLEM — D22.9 MULTIPLE BENIGN NEVI: Status: ACTIVE | Noted: 2021-09-16

## 2023-07-27 ENCOUNTER — APPOINTMENT (OUTPATIENT)
Dept: DERMATOLOGY | Facility: CLINIC | Age: 28
End: 2023-07-27
Payer: COMMERCIAL

## 2023-07-27 DIAGNOSIS — D48.5 NEOPLASM OF UNCERTAIN BEHAVIOR OF SKIN: ICD-10-CM

## 2023-07-27 DIAGNOSIS — L81.4 OTHER MELANIN HYPERPIGMENTATION: ICD-10-CM

## 2023-07-27 DIAGNOSIS — D22.9 MELANOCYTIC NEVI, UNSPECIFIED: ICD-10-CM

## 2023-07-27 DIAGNOSIS — L91.0 HYPERTROPHIC SCAR: ICD-10-CM

## 2023-07-27 DIAGNOSIS — L70.0 ACNE VULGARIS: ICD-10-CM

## 2023-07-27 PROCEDURE — 11900 INJECT SKIN LESIONS </W 7: CPT | Mod: 59

## 2023-07-27 PROCEDURE — 99213 OFFICE O/P EST LOW 20 MIN: CPT | Mod: 25

## 2023-07-27 PROCEDURE — 11102 TANGNTL BX SKIN SINGLE LES: CPT | Mod: 59

## 2023-07-27 NOTE — PHYSICAL EXAM
[FreeTextEntry3] : AAOx3, pleasant, NAD, no visual lymphadenopathy\par hair, scalp, face, nose, eyelids, ears, lips, oropharynx, neck, chest, abdomen, back, right arm, left arm, nails, and hands examined with all normal findings,\par pertinent findings include:\par \par yellow papules under eyes and fibrous papules\par red papule on left upper cheek\par keloid on upper back

## 2023-07-27 NOTE — ASSESSMENT
[FreeTextEntry1] : 1) benign findings as above- education\par \par 2) acne/milia/fibrous papules\par \par 3) keloid\par Risks and benefits were discussed including including atrophy, discoloration\par Intralesional triamcinolone, concentration  10  mg/cc;\par Total volume    0.1  cc\par \par Sites: 1\par \par 4) Shave bx location left upper cheek\par diagnosis: r/o BCC vs angioma\par  \par Shave biopsy performed today over above location, risks and benefits discussed including incomplete removal, not enough tissue for diagnosis scarring and infection, informed consent obtained, pictures taken,  cleaned with alcohol and anesthetized with 1%lido+epi, 0.3 cc total, hemostasis obtained with monsels, vaseline and bandaid placed, tolerated well, wound care reviewed, specimen sent to pathology.\par \par

## 2023-07-27 NOTE — HISTORY OF PRESENT ILLNESS
[FreeTextEntry1] : spot [de-identified] : 28 year old new growth under left eye. started 5 months prior.\par spot on upper back. itchy and enlarging.\par spots on face.

## 2023-08-03 ENCOUNTER — RX RENEWAL (OUTPATIENT)
Age: 28
End: 2023-08-03

## 2023-08-07 ENCOUNTER — RX RENEWAL (OUTPATIENT)
Age: 28
End: 2023-08-07

## 2023-08-08 LAB — DERMATOLOGY BIOPSY: NORMAL

## 2023-10-03 ENCOUNTER — TRANSCRIPTION ENCOUNTER (OUTPATIENT)
Age: 28
End: 2023-10-03

## 2023-10-13 ENCOUNTER — RX RENEWAL (OUTPATIENT)
Age: 28
End: 2023-10-13

## 2023-10-20 ENCOUNTER — RX RENEWAL (OUTPATIENT)
Age: 28
End: 2023-10-20

## 2023-12-27 ENCOUNTER — APPOINTMENT (OUTPATIENT)
Dept: INTERNAL MEDICINE | Facility: CLINIC | Age: 28
End: 2023-12-27
Payer: COMMERCIAL

## 2023-12-27 VITALS
TEMPERATURE: 97.9 F | WEIGHT: 172.25 LBS | HEART RATE: 76 BPM | BODY MASS INDEX: 29.41 KG/M2 | OXYGEN SATURATION: 99 % | SYSTOLIC BLOOD PRESSURE: 132 MMHG | RESPIRATION RATE: 16 BRPM | DIASTOLIC BLOOD PRESSURE: 80 MMHG | HEIGHT: 64 IN

## 2023-12-27 DIAGNOSIS — L65.9 NONSCARRING HAIR LOSS, UNSPECIFIED: ICD-10-CM

## 2023-12-27 PROCEDURE — 99214 OFFICE O/P EST MOD 30 MIN: CPT | Mod: 25

## 2023-12-27 PROCEDURE — 36415 COLL VENOUS BLD VENIPUNCTURE: CPT

## 2023-12-27 NOTE — PHYSICAL EXAM
[No Acute Distress] : no acute distress [Well Nourished] : well nourished [Well Developed] : well developed [Well-Appearing] : well-appearing [Normal Sclera/Conjunctiva] : normal sclera/conjunctiva [PERRL] : pupils equal round and reactive to light [EOMI] : extraocular movements intact [Normal Outer Ear/Nose] : the outer ears and nose were normal in appearance [Normal Oropharynx] : the oropharynx was normal [No JVD] : no jugular venous distention [No Lymphadenopathy] : no lymphadenopathy [Supple] : supple [Thyroid Normal, No Nodules] : the thyroid was normal and there were no nodules present [No Respiratory Distress] : no respiratory distress  [No Accessory Muscle Use] : no accessory muscle use [Clear to Auscultation] : lungs were clear to auscultation bilaterally [Normal Rate] : normal rate  [Regular Rhythm] : with a regular rhythm [Normal S1, S2] : normal S1 and S2 [No Carotid Bruits] : no carotid bruits [No Abdominal Bruit] : a ~M bruit was not heard ~T in the abdomen [No Varicosities] : no varicosities [Pedal Pulses Present] : the pedal pulses are present [No Edema] : there was no peripheral edema [No Palpable Aorta] : no palpable aorta [No Extremity Clubbing/Cyanosis] : no extremity clubbing/cyanosis [Soft] : abdomen soft [Non Tender] : non-tender [Non-distended] : non-distended [No Masses] : no abdominal mass palpated [No HSM] : no HSM [Normal Bowel Sounds] : normal bowel sounds [Declined Rectal Exam] : declined rectal exam [No CVA Tenderness] : no CVA  tenderness [No Spinal Tenderness] : no spinal tenderness [No Joint Swelling] : no joint swelling [Grossly Normal Strength/Tone] : grossly normal strength/tone [No Rash] : no rash [Coordination Grossly Intact] : coordination grossly intact [No Focal Deficits] : no focal deficits [Normal Gait] : normal gait [Deep Tendon Reflexes (DTR)] : deep tendon reflexes were 2+ and symmetric [Speech Grossly Normal] : speech grossly normal [Normal Affect] : the affect was normal [Alert and Oriented x3] : oriented to person, place, and time [Normal Mood] : the mood was normal [Normal Insight/Judgement] : insight and judgment were intact [Normal TMs] : both tympanic membranes were normal [Normal Posterior Cervical Nodes] : no posterior cervical lymphadenopathy [Normal Anterior Cervical Nodes] : no anterior cervical lymphadenopathy [de-identified] : with murmur 1/6 [de-identified] : As per gynecology [FreeTextEntry1] : As per gynecology [de-identified] : As per gynecology

## 2023-12-27 NOTE — HEALTH RISK ASSESSMENT
[Yes] : Yes [2 - 4 times a month (2 pts)] : 2-4 times a month (2 points) [3 or 4 (1 pt)] : 3 or 4  (1 point) [Never (0 pts)] : Never (0 points) [No] : In the past 12 months have you used drugs other than those required for medical reasons? No [No falls in past year] : Patient reported no falls in the past year [0] : 2) Feeling down, depressed, or hopeless: Not at all (0) [PHQ-2 Negative - No further assessment needed] : PHQ-2 Negative - No further assessment needed [Never] : Never [de-identified] : GYN, gastroenterology [Audit-CScore] : 3 [de-identified] : 3x a week  walk daily - gym -  incline walk  [[id number]] : 0 [de-identified] : Regular

## 2023-12-27 NOTE — PLAN
[FreeTextEntry1] : Endocrinology -hyperlipidemia more likely familial hyperlipidemia secondary to the severely elevated LDL.  Continue with Crestor 10 mg daily.   Discussed side effects of the current medication.  Check lipid panel. Again advised about pregnancy   GYN- Dysmenorrhea -follow-up with gynecology -continue with birth control pill.     Advised patient also to discuss abdominal bloating with her gynecologist    Symptoms related to her menstrual cycle.  Also discussed night sweats being on the birth control pills.  Psych-  anxiety - check labs - Counseling given -  continue with escitalopram 15 mg daily counseling given to the patient.  Check labs today.   Dermatology ZEFERINO was encouraged to wear sun protective clothing, sun block, and proper use of SPF board brim hats, to seek shade and to avoid the sun in peak hours.  ABCD of skin moles was advised.  thinning hair - check labs   Gastroenterology -follow-up with gastroenterologist.  Continue with Nikolay   Patient  education  - COVID-19   Counseling and education provide to the patient.  Advised sign and symptoms of the virus.  Advised contact precautions.  Educated patient on proper hand washing and to participate in social distancing. Had Moderna vax.    Patient is in full awareness of the plan and agrees to it.  All pt question was answered.    I spent 30 Minutes with the patient, half of which we discussed finding on physical exam and coordinated care.  As well as reviewed my plans and follow ups. Dragon speech recognition software was used to create portions of this document.  An attempt at proofreading has been made to minimize errors please call if any questions arise.   RTO 6 months for CPX

## 2023-12-27 NOTE — PAST MEDICAL HISTORY
[Menstruating] : menstruating [Definite ___ (Date)] : the last menstrual period was [unfilled] [Regular Cycle Intervals] : have been regular [Total Preg ___] : G[unfilled] [FreeTextEntry1] : denies preg

## 2023-12-27 NOTE — ASSESSMENT
[FreeTextEntry1] : Ms. MARCIAL is a 28 year female, with a past medical history as noted above, who present to the office today for medication renewal, fasting labs and a check-up.

## 2023-12-27 NOTE — HISTORY OF PRESENT ILLNESS
[FreeTextEntry1] : Medication renewal and fasting labs  [de-identified] : Mrs. MARCIAL is a 28 year- female, with a past medical history as noted below, who present to the office today for medication renewal and fasting labs.  States she recently saw GYN Dr Nick exam was. normal. Doing well on currant medication regimen.  Denies any side effects. Denies having chest pain, SOB, COLINDRES.

## 2023-12-28 DIAGNOSIS — E87.1 HYPO-OSMOLALITY AND HYPONATREMIA: ICD-10-CM

## 2023-12-28 LAB
25(OH)D3 SERPL-MCNC: 47 NG/ML
ALBUMIN SERPL ELPH-MCNC: 4.6 G/DL
ALP BLD-CCNC: 54 U/L
ALT SERPL-CCNC: 15 U/L
ANION GAP SERPL CALC-SCNC: 16 MMOL/L
AST SERPL-CCNC: 20 U/L
BASOPHILS # BLD AUTO: 0.02 K/UL
BASOPHILS NFR BLD AUTO: 0.3 %
BILIRUB SERPL-MCNC: 0.2 MG/DL
BUN SERPL-MCNC: 10 MG/DL
CALCIUM SERPL-MCNC: 9.6 MG/DL
CHLORIDE SERPL-SCNC: 99 MMOL/L
CHOLEST SERPL-MCNC: 287 MG/DL
CO2 SERPL-SCNC: 19 MMOL/L
CREAT SERPL-MCNC: 0.64 MG/DL
EGFR: 123 ML/MIN/1.73M2
EOSINOPHIL # BLD AUTO: 0.04 K/UL
EOSINOPHIL NFR BLD AUTO: 0.6 %
GLUCOSE SERPL-MCNC: 87 MG/DL
HCT VFR BLD CALC: 36.2 %
HDLC SERPL-MCNC: 65 MG/DL
HGB BLD-MCNC: 12.1 G/DL
IMM GRANULOCYTES NFR BLD AUTO: 0.2 %
IRON SATN MFR SERPL: 13 %
IRON SERPL-MCNC: 67 UG/DL
LDLC SERPL CALC-MCNC: 190 MG/DL
LYMPHOCYTES # BLD AUTO: 2.15 K/UL
LYMPHOCYTES NFR BLD AUTO: 34.7 %
MAN DIFF?: NORMAL
MCHC RBC-ENTMCNC: 31.1 PG
MCHC RBC-ENTMCNC: 33.4 GM/DL
MCV RBC AUTO: 93.1 FL
MONOCYTES # BLD AUTO: 0.43 K/UL
MONOCYTES NFR BLD AUTO: 6.9 %
NEUTROPHILS # BLD AUTO: 3.55 K/UL
NEUTROPHILS NFR BLD AUTO: 57.3 %
NONHDLC SERPL-MCNC: 222 MG/DL
PLATELET # BLD AUTO: 234 K/UL
POTASSIUM SERPL-SCNC: 4.3 MMOL/L
PROT SERPL-MCNC: 7.4 G/DL
RBC # BLD: 3.89 M/UL
RBC # FLD: 11.9 %
SODIUM SERPL-SCNC: 134 MMOL/L
TIBC SERPL-MCNC: 510 UG/DL
TRIGL SERPL-MCNC: 174 MG/DL
TSH SERPL-ACNC: 1.04 UIU/ML
UIBC SERPL-MCNC: 443 UG/DL
WBC # FLD AUTO: 6.2 K/UL

## 2024-01-13 ENCOUNTER — LABORATORY RESULT (OUTPATIENT)
Age: 29
End: 2024-01-13

## 2024-02-13 ENCOUNTER — RX RENEWAL (OUTPATIENT)
Age: 29
End: 2024-02-13

## 2024-02-13 RX ORDER — DROSPIRENONE AND ETHINYL ESTRADIOL 0.03MG-3MG
3-0.03 KIT ORAL DAILY
Qty: 84 | Refills: 1 | Status: ACTIVE | COMMUNITY
Start: 2022-04-07 | End: 1900-01-01

## 2024-02-26 ENCOUNTER — NON-APPOINTMENT (OUTPATIENT)
Age: 29
End: 2024-02-26

## 2024-03-18 NOTE — HISTORY OF PRESENT ILLNESS
[FreeTextEntry1] : ZEFERINO MARCIAL is a 26 year F G0 here for annual and worsening dysmenorrhea. Previously patient was being treated empirically for endometriosis with OCPs. Patient did not like ocps and stopped them after 3 months. Patient reports pelvic pain worse because not related only to menses, has it throughout cycle. Not sexually active. Menses are regula intervals. Saw a PCP who sent her for sono last week and did hormone test - total testosterone elevated - unfortunately not correct test was sent.\par Denies vaginal bleeding, discharge.\par \par BC:denies\par Gynhx: Reg menses, No h/o STIs/fibroids/cysts/abn paps - never had pap yet\par Obhx:nullip\par  Patient informed

## 2024-03-20 ENCOUNTER — RX RENEWAL (OUTPATIENT)
Age: 29
End: 2024-03-20

## 2024-03-26 ENCOUNTER — RX RENEWAL (OUTPATIENT)
Age: 29
End: 2024-03-26

## 2024-06-06 ENCOUNTER — RX RENEWAL (OUTPATIENT)
Age: 29
End: 2024-06-06

## 2024-06-10 ENCOUNTER — RX RENEWAL (OUTPATIENT)
Age: 29
End: 2024-06-10

## 2024-06-10 RX ORDER — ROSUVASTATIN CALCIUM 10 MG/1
10 TABLET, FILM COATED ORAL
Qty: 30 | Refills: 0 | Status: ACTIVE | COMMUNITY
Start: 2022-12-05 | End: 1900-01-01

## 2024-06-20 ENCOUNTER — NON-APPOINTMENT (OUTPATIENT)
Age: 29
End: 2024-06-20

## 2024-06-21 ENCOUNTER — APPOINTMENT (OUTPATIENT)
Dept: INTERNAL MEDICINE | Facility: CLINIC | Age: 29
End: 2024-06-21
Payer: COMMERCIAL

## 2024-06-21 VITALS
SYSTOLIC BLOOD PRESSURE: 128 MMHG | BODY MASS INDEX: 30.24 KG/M2 | HEIGHT: 64 IN | TEMPERATURE: 97.7 F | WEIGHT: 177.13 LBS | OXYGEN SATURATION: 98 % | RESPIRATION RATE: 16 BRPM | DIASTOLIC BLOOD PRESSURE: 80 MMHG | HEART RATE: 62 BPM

## 2024-06-21 DIAGNOSIS — E78.5 HYPERLIPIDEMIA, UNSPECIFIED: ICD-10-CM

## 2024-06-21 DIAGNOSIS — R61 GENERALIZED HYPERHIDROSIS: ICD-10-CM

## 2024-06-21 DIAGNOSIS — Z13.220 ENCOUNTER FOR SCREENING FOR LIPOID DISORDERS: ICD-10-CM

## 2024-06-21 DIAGNOSIS — R79.89 OTHER SPECIFIED ABNORMAL FINDINGS OF BLOOD CHEMISTRY: ICD-10-CM

## 2024-06-21 DIAGNOSIS — F41.9 ANXIETY DISORDER, UNSPECIFIED: ICD-10-CM

## 2024-06-21 DIAGNOSIS — Z13.0 ENCOUNTER FOR SCREENING FOR DISEASES OF THE BLOOD AND BLOOD-FORMING ORGANS AND CERTAIN DISORDERS INVOLVING THE IMMUNE MECHANISM: ICD-10-CM

## 2024-06-21 PROCEDURE — 99214 OFFICE O/P EST MOD 30 MIN: CPT

## 2024-06-21 PROCEDURE — 36415 COLL VENOUS BLD VENIPUNCTURE: CPT

## 2024-06-21 PROCEDURE — G2211 COMPLEX E/M VISIT ADD ON: CPT

## 2024-06-21 RX ORDER — ESCITALOPRAM OXALATE 10 MG/1
10 TABLET ORAL
Qty: 30 | Refills: 2 | Status: ACTIVE | COMMUNITY
Start: 2021-03-31 | End: 1900-01-01

## 2024-06-21 NOTE — PHYSICAL EXAM
[No Acute Distress] : no acute distress [Well Nourished] : well nourished [Well Developed] : well developed [Well-Appearing] : well-appearing [Normal Sclera/Conjunctiva] : normal sclera/conjunctiva [PERRL] : pupils equal round and reactive to light [EOMI] : extraocular movements intact [Normal Outer Ear/Nose] : the outer ears and nose were normal in appearance [Normal Oropharynx] : the oropharynx was normal [Normal TMs] : both tympanic membranes were normal [No JVD] : no jugular venous distention [No Lymphadenopathy] : no lymphadenopathy [Supple] : supple [Thyroid Normal, No Nodules] : the thyroid was normal and there were no nodules present [No Respiratory Distress] : no respiratory distress  [No Accessory Muscle Use] : no accessory muscle use [Clear to Auscultation] : lungs were clear to auscultation bilaterally [Normal Rate] : normal rate  [Regular Rhythm] : with a regular rhythm [Normal S1, S2] : normal S1 and S2 [No Carotid Bruits] : no carotid bruits [No Abdominal Bruit] : a ~M bruit was not heard ~T in the abdomen [No Varicosities] : no varicosities [Pedal Pulses Present] : the pedal pulses are present [No Edema] : there was no peripheral edema [No Palpable Aorta] : no palpable aorta [No Extremity Clubbing/Cyanosis] : no extremity clubbing/cyanosis [Soft] : abdomen soft [Non Tender] : non-tender [Non-distended] : non-distended [No Masses] : no abdominal mass palpated [No HSM] : no HSM [Normal Bowel Sounds] : normal bowel sounds [Declined Rectal Exam] : declined rectal exam [Normal Posterior Cervical Nodes] : no posterior cervical lymphadenopathy [Normal Anterior Cervical Nodes] : no anterior cervical lymphadenopathy [No CVA Tenderness] : no CVA  tenderness [No Spinal Tenderness] : no spinal tenderness [No Joint Swelling] : no joint swelling [Grossly Normal Strength/Tone] : grossly normal strength/tone [No Rash] : no rash [Coordination Grossly Intact] : coordination grossly intact [No Focal Deficits] : no focal deficits [Normal Gait] : normal gait [Deep Tendon Reflexes (DTR)] : deep tendon reflexes were 2+ and symmetric [Speech Grossly Normal] : speech grossly normal [Normal Affect] : the affect was normal [Alert and Oriented x3] : oriented to person, place, and time [Normal Mood] : the mood was normal [Normal Insight/Judgement] : insight and judgment were intact [de-identified] : with murmur 1/6 [de-identified] : As per gynecology [FreeTextEntry1] : As per gynecology [de-identified] : As per gynecology

## 2024-06-21 NOTE — REVIEW OF SYSTEMS
[Night Sweats] : night sweats [Anxiety] : anxiety [Negative] : Genitourinary [Fever] : no fever [Chills] : no chills [Fatigue] : no fatigue [Hot Flashes] : no hot flashes [Recent Change In Weight] : ~T no recent weight change [Pain] : no pain [Vision Problems] : no vision problems [Itching] : no itching [Earache] : no earache [Nosebleed] : no nosebleeds [Hoarseness] : no hoarseness [Nasal Discharge] : no nasal discharge [Sore Throat] : no sore throat [Postnasal Drip] : no postnasal drip [Chest Pain] : no chest pain [Palpitations] : no palpitations [Leg Claudication] : no leg claudication [Lower Ext Edema] : no lower extremity edema [Shortness Of Breath] : no shortness of breath [Cough] : no cough [Dyspnea on Exertion] : no dyspnea on exertion [Abdominal Pain] : no abdominal pain [Nausea] : no nausea [Constipation] : no constipation [Diarrhea] : diarrhea [Vomiting] : no vomiting [Heartburn] : no heartburn [Melena] : no melena [Dysuria] : no dysuria [Incontinence] : no incontinence [Hematuria] : no hematuria [Frequency] : no frequency [Dysmenorrhea] : no dysmenorrhea [Joint Pain] : no joint pain [Joint Stiffness] : no joint stiffness [Muscle Pain] : no muscle pain [Back Pain] : no back pain [Nail Changes] : no nail changes [Hair Changes] : no hair changes [Skin Rash] : no skin rash [Headache] : no headache [Dizziness] : no dizziness [Memory Loss] : no memory loss [Unsteady Walking] : no ataxia [Depression] : no depression [Easy Bruising] : no easy bruising [Swollen Glands] : no swollen glands

## 2024-06-21 NOTE — PAST MEDICAL HISTORY
[Menstruating] : menstruating [Regular Cycle Intervals] : have been regular [Total Preg ___] : G[unfilled] [Approximately ___] : the LMP was approximately [unfilled] [FreeTextEntry1] : denies preg

## 2024-06-21 NOTE — PLAN
[FreeTextEntry1] : Endocrinology -hyperlipidemia more likely familial hyperlipidemia secondary to the severely elevated LDL.  Continue with Crestor 10 mg daily.   Discussed side effects of the current medication.  Check lipid panel. Again advised about pregnancy   GYN- Dysmenorrhea -follow-up with gynecology on Monday.     History of elevated testosterone level.  Advised patient to follow-up with gynecology.  Check labs today check prolactin level.    Psych-  anxiety - check labs - Counseling given - Patient doing well on medication besides having night sweats.  Not sure if it correlates to her current medication escitalopram.  Advised patient decrease dose to 10 mg to see if any improvement there.  Advised patient if anxiety seems to be getting worse call office immediately.  Check labs   Dermatology ZEFERINO was encouraged to wear sun protective clothing, sun block, and proper use of SPF board brim hats, to seek shade and to avoid the sun in peak hours.  ABCD of skin moles was advised.  thinning hair - check labs   Gastroenterology -follow-up with gastroenterologist.  Continue with Bently As needed.  Patient is in full awareness of the plan and agrees to it.  All pt question was answered.    I spent 35 Minutes with the patient, half of which we discussed finding on physical exam and coordinated care.  As well as reviewed my plans and follow ups. Dragon speech recognition software was used to create portions of this document.  An attempt at proofreading has been made to minimize errors please call if any questions arise.   RTO 6 months for CPX

## 2024-06-21 NOTE — ASSESSMENT
[FreeTextEntry1] : Ms. MARCIAL is a 29 year female, with a past medical history as noted above, who present to the office today for medication renewal, fasting labs and a check-up.

## 2024-06-21 NOTE — HISTORY OF PRESENT ILLNESS
[FreeTextEntry1] : Medication renewal and fasting labs  [de-identified] : Mrs. MARCIAL is a 29 year- female, with a past medical history as noted below, who present to the office today for medication renewal and fasting labs. Patient states she is taking escitalopram 15 mg daily.  States it helps with her anxiety but since starting the medication breaks out with night sweats.  Otherwise generally feels well. States she has appointment with GYN - Dr Smith Monday. Denies any recent infection. Does states she is under slight stress secondary to going home.

## 2024-06-21 NOTE — HEALTH RISK ASSESSMENT
[Yes] : Yes [2 - 4 times a month (2 pts)] : 2-4 times a month (2 points) [3 or 4 (1 pt)] : 3 or 4  (1 point) [Never (0 pts)] : Never (0 points) [No] : In the past 12 months have you used drugs other than those required for medical reasons? No [No falls in past year] : Patient reported no falls in the past year [0] : 2) Feeling down, depressed, or hopeless: Not at all (0) [PHQ-2 Negative - No further assessment needed] : PHQ-2 Negative - No further assessment needed [Never] : Never [de-identified] : GYN, gastroenterology [Audit-CScore] : 3 [de-identified] : 3x a week  walk daily - gym -  incline walk  [de-identified] : Regular  [[id number]] : 0

## 2024-06-26 DIAGNOSIS — R76.8 OTHER SPECIFIED ABNORMAL IMMUNOLOGICAL FINDINGS IN SERUM: ICD-10-CM

## 2024-06-26 LAB
25(OH)D3 SERPL-MCNC: 56.7 NG/ML
ALBUMIN SERPL ELPH-MCNC: 4.5 G/DL
ALP BLD-CCNC: 55 U/L
ALT SERPL-CCNC: 29 U/L
ANA PAT FLD IF-IMP: ABNORMAL
ANA SER IF-ACNC: ABNORMAL
ANION GAP SERPL CALC-SCNC: 16 MMOL/L
AST SERPL-CCNC: 41 U/L
BASOPHILS # BLD AUTO: 0.02 K/UL
BASOPHILS NFR BLD AUTO: 0.3 %
BILIRUB SERPL-MCNC: 0.5 MG/DL
BUN SERPL-MCNC: 11 MG/DL
CALCIUM SERPL-MCNC: 9.8 MG/DL
CHLORIDE SERPL-SCNC: 101 MMOL/L
CHOLEST SERPL-MCNC: 191 MG/DL
CO2 SERPL-SCNC: 18 MMOL/L
CREAT SERPL-MCNC: 0.72 MG/DL
EGFR: 115 ML/MIN/1.73M2
EOSINOPHIL # BLD AUTO: 0.08 K/UL
EOSINOPHIL NFR BLD AUTO: 1.1 %
ESTRADIOL SERPL-MCNC: 12 PG/ML
GLUCOSE SERPL-MCNC: 86 MG/DL
HCT VFR BLD CALC: 37.6 %
HDLC SERPL-MCNC: 66 MG/DL
HGB BLD-MCNC: 12.5 G/DL
IMM GRANULOCYTES NFR BLD AUTO: 0.1 %
LDLC SERPL CALC-MCNC: 102 MG/DL
LYMPHOCYTES # BLD AUTO: 1.93 K/UL
LYMPHOCYTES NFR BLD AUTO: 26.6 %
MAN DIFF?: NORMAL
MCHC RBC-ENTMCNC: 32.1 PG
MCHC RBC-ENTMCNC: 33.2 GM/DL
MCV RBC AUTO: 96.4 FL
MONOCYTES # BLD AUTO: 0.44 K/UL
MONOCYTES NFR BLD AUTO: 6.1 %
NEUTROPHILS # BLD AUTO: 4.78 K/UL
NEUTROPHILS NFR BLD AUTO: 65.8 %
NONHDLC SERPL-MCNC: 125 MG/DL
PLATELET # BLD AUTO: 210 K/UL
POTASSIUM SERPL-SCNC: 4.4 MMOL/L
PROLACTIN SERPL-MCNC: 7.2 NG/ML
PROT SERPL-MCNC: 7.4 G/DL
RBC # BLD: 3.9 M/UL
RBC # FLD: 12.6 %
SODIUM SERPL-SCNC: 136 MMOL/L
TESTOST SERPL-MCNC: 38.9 NG/DL
TRIGL SERPL-MCNC: 134 MG/DL
TSH SERPL-ACNC: 1.18 UIU/ML
WBC # FLD AUTO: 7.26 K/UL

## 2024-07-15 ENCOUNTER — NON-APPOINTMENT (OUTPATIENT)
Age: 29
End: 2024-07-15

## 2024-07-15 ENCOUNTER — APPOINTMENT (OUTPATIENT)
Dept: OBGYN | Facility: CLINIC | Age: 29
End: 2024-07-15

## 2024-07-15 VITALS
WEIGHT: 180 LBS | HEIGHT: 64 IN | BODY MASS INDEX: 30.73 KG/M2 | SYSTOLIC BLOOD PRESSURE: 132 MMHG | DIASTOLIC BLOOD PRESSURE: 84 MMHG

## 2024-07-15 DIAGNOSIS — Z01.419 ENCOUNTER FOR GYNECOLOGICAL EXAMINATION (GENERAL) (ROUTINE) W/OUT ABNORMAL FINDINGS: ICD-10-CM

## 2024-07-15 DIAGNOSIS — Z30.09 ENCOUNTER FOR OTHER GENERAL COUNSELING AND ADVICE ON CONTRACEPTION: ICD-10-CM

## 2024-07-15 DIAGNOSIS — Z11.51 ENCOUNTER FOR SCREENING FOR HUMAN PAPILLOMAVIRUS (HPV): ICD-10-CM

## 2024-07-15 DIAGNOSIS — Z12.4 ENCOUNTER FOR SCREENING FOR MALIGNANT NEOPLASM OF CERVIX: ICD-10-CM

## 2024-07-15 PROCEDURE — 99459 PELVIC EXAMINATION: CPT

## 2024-07-15 PROCEDURE — 99395 PREV VISIT EST AGE 18-39: CPT

## 2024-07-16 LAB — HPV HIGH+LOW RISK DNA PNL CVX: NOT DETECTED

## 2024-07-18 LAB — CYTOLOGY CVX/VAG DOC THIN PREP: NORMAL

## 2024-07-19 ENCOUNTER — APPOINTMENT (OUTPATIENT)
Dept: RHEUMATOLOGY | Facility: CLINIC | Age: 29
End: 2024-07-19
Payer: COMMERCIAL

## 2024-07-19 ENCOUNTER — LABORATORY RESULT (OUTPATIENT)
Age: 29
End: 2024-07-19

## 2024-07-19 VITALS
HEIGHT: 64 IN | BODY MASS INDEX: 30.73 KG/M2 | HEART RATE: 73 BPM | SYSTOLIC BLOOD PRESSURE: 120 MMHG | DIASTOLIC BLOOD PRESSURE: 80 MMHG | OXYGEN SATURATION: 98 % | WEIGHT: 180 LBS

## 2024-07-19 DIAGNOSIS — R61 GENERALIZED HYPERHIDROSIS: ICD-10-CM

## 2024-07-19 DIAGNOSIS — R76.8 OTHER SPECIFIED ABNORMAL IMMUNOLOGICAL FINDINGS IN SERUM: ICD-10-CM

## 2024-07-19 DIAGNOSIS — L65.9 NONSCARRING HAIR LOSS, UNSPECIFIED: ICD-10-CM

## 2024-07-19 DIAGNOSIS — Z78.9 OTHER SPECIFIED HEALTH STATUS: ICD-10-CM

## 2024-07-19 DIAGNOSIS — R53.83 OTHER FATIGUE: ICD-10-CM

## 2024-07-19 PROCEDURE — 99204 OFFICE O/P NEW MOD 45 MIN: CPT

## 2024-07-19 RX ORDER — METHOCARBAMOL 500 MG/1
500 TABLET, FILM COATED ORAL
Qty: 10 | Refills: 0 | Status: DISCONTINUED | COMMUNITY
Start: 2024-02-27 | End: 2024-07-19

## 2024-07-21 LAB
APPEARANCE: CLEAR
BILIRUBIN URINE: NEGATIVE
BLOOD URINE: ABNORMAL
COLOR: YELLOW
GLUCOSE QUALITATIVE U: NEGATIVE MG/DL
KETONES URINE: NEGATIVE MG/DL
LEUKOCYTE ESTERASE URINE: NEGATIVE
NITRITE URINE: NEGATIVE
PH URINE: 6
PROTEIN URINE: NEGATIVE MG/DL
SPECIFIC GRAVITY URINE: 1.02
UROBILINOGEN URINE: 1 MG/DL

## 2024-07-22 LAB
C3 SERPL-MCNC: 195 MG/DL
C4 SERPL-MCNC: 24 MG/DL
CCP AB SER IA-ACNC: <8 UNITS
CREAT SPEC-SCNC: 77 MG/DL
CREAT/PROT UR: 0.1 RATIO
CRP SERPL-MCNC: 3 MG/L
DSDNA AB SER-ACNC: <1 IU/ML
ENA RNP AB SER IA-ACNC: <0.2 AL
ENA SM AB SER IA-ACNC: <0.2 AL
ENA SS-A AB SER IA-ACNC: 0.5 AL
ENA SS-B AB SER IA-ACNC: <0.2 AL
ERYTHROCYTE [SEDIMENTATION RATE] IN BLOOD BY WESTERGREN METHOD: 8 MM/HR
PROT UR-MCNC: 8 MG/DL
RF+CCP IGG SER-IMP: NEGATIVE
RHEUMATOID FACT SER QL: <10 IU/ML
THYROGLOB AB SERPL-ACNC: 24.8 IU/ML
THYROPEROXIDASE AB SERPL IA-ACNC: <10 IU/ML

## 2024-07-25 NOTE — ED ADULT TRIAGE NOTE - NS ED NURSE BANDS TYPE
PROGRESS NOTE    CC:   Chief Complaint   Patient presents with   • Office Visit     Post op DOS 7/9/24 Rt foot amp / Xrays done on same day        HISTORY:Sandor Harris is a 58 year old male presenting for follow-up after having right great toe amputation on July 9, 2024 while hospitalized at [facility].  Patient is still has stitches in his toe.  He has been painting it with Betadine on a regular basis and using a gauze dressing.  He reports no issues with healing.  He has previously had a partial 3rd toe amputation due to infection.  Is curious about diabetic shoes and insoles.    Current Outpatient Medications   Medication Sig   • HYDROcodone-acetaminophen (NORCO) 5-325 MG per tablet Take 1 tablet by mouth every 6 hours as needed for Pain.   • metoPROLOL succinate (TOPROL-XL) 25 MG 24 hr tablet Take 25 mg by mouth in the morning and 25 mg in the evening.   • traZODone (DESYREL) 50 MG tablet Take 50 mg by mouth nightly as needed for Sleep.   • furosemide (Lasix) 40 MG tablet Take 1 tablet by mouth 2 times daily.   • AMIODarone (PACERONE) 200 MG tablet Take 1 tablet by mouth daily.   • pantoprazole (PROTONIX) 40 MG tablet Take 1 tablet by mouth daily (before breakfast).   • Vitamin D, Ergocalciferol, 1.25 mg (50,000 units) capsule Take 1 capsule by mouth 1 day a week.   • folic acid (FOLATE) 1 MG tablet Take 1 tablet by mouth daily.   • insulin glargine (Lantus SoloStar) 100 UNIT/ML pen-injector Inject 20 Units into the skin nightly. Indications: Type 2 Diabetes Prime 2 units before each dose.   • gabapentin (NEURONTIN) 100 MG capsule Take 100 mg by mouth 2 times daily as needed (pain).   • aspirin (Aspirin Low Dose) 81 MG EC tablet Take 81 mg by mouth nightly.   • amoxicillin (AMOXIL) 500 MG capsule Take 4 capsules by mouth as needed (1 hour prior to dental work and cleaning). Do not start before January 30, 2024.   • Insulin Lispro, 1 Unit Dial, (HumaLOG KwikPen) 100 UNIT/ML pen-injector  Inject 18 Units into the skin in the morning and 18 Units at noon and 18 Units in the evening. Inject with meals. If BG < 100 subtract 4 units, if not eating hold. If BG >150 add 2 units, > 200 add 4 units, > 250 add 6 units, > 300 add 8 units, > 350 add 10 units.   • Continuous Glucose Sensor (Dexcom G7 Sensor) Misc Apply sensor and use for 10 days then replace with new sensor   • potassium CHLORIDE 10 MEQ ER tablet Take 1 tablet by mouth daily.   • atorvastatin (LIPITOR) 80 MG tablet Take 1 tablet by mouth nightly.   • metformin (GLUCOPHAGE) 1000 MG tablet Take 1 tablet by mouth 2 times daily (with meals).     No current facility-administered medications for this visit.     ALLERGIES:   Allergen Reactions   • Bee Sting SWELLING and ANAPHYLAXIS     Past Medical History:   Diagnosis Date   • Acute medial meniscus tear of left knee    • Barretts esophagus 09/20/2022    dr. dias, stable, recall 3 years   • Blindness    • CAD (coronary artery disease)    • Carotid stenosis, bilateral    • Colon polyp 09/20/2022    dr. dias, 1 tubular adenoma, 1 hyperplastic polyp recall 5 years   • Colon polyps 03/11/2019    3 yr recall, tubular adenoma / Dr. Dias   • Diabetic nephropathy associated with type 2 diabetes mellitus  (CMD) 04/19/2022   • Diverticulosis    • Former smoker    • Hyperlipidemia    • Hypertension    • Internal hemorrhoid    • Left ACL tear    • Microalbuminuria 07/25/2018   • Noncompliance with medications    • Old myocardial infarction    • Polyp of colon, hyperplastic 04/11/2017   • PONV (postoperative nausea and vomiting)    • S/P aortic valve replacement with bioprosthetic valve 12/24/2023   • S/P CABG x 3 12/24/2023   • Type II or unspecified type diabetes mellitus without mention of complication, not stated as uncontrolled     Use Dexcom 6 Glucose Monitor     PCP: Moriah Huber DO      PHYSICAL EXAM:   GENERAL:  The patient is well-groomed, well-developed, well-nourished male in  no apparent  distress, is alert, oriented x 3, and with normal mood and affect.  There were no vitals taken for this visit.  MUSCULOSKELETAL:  Status post great toe and partial 3rd toe amputations right foot  DERM:  Incision site well healed upon removal of sutures.  VASC: DP and PT pulses palpable and rated at 2/4 right.    NEURO: Sensation diminished to light touch and painful stimuli right    DIAGNOSTIC TESTS:    glycohemoglobin  Last Lab A1C:  Hemoglobin A1C (%)   Date Value   12/20/2023 8.3 (H)       Last Point of Care A1C:  Hemoglobin A1C, POC (%)   Date Value   07/20/2023 8.8 (A)     Hemoglobin A1C POC (%)   Date Value   02/24/2020 11.1 (A)         ASSESSMENT/DIAGNOSIS:  1. DM (diabetes mellitus), type 2 with neurological complications  (CMD)    2. S/P amputation of lesser toe, right  (CMD)    3. Amputation of right great toe (CMD)        PLAN:   Foot exam performed.  Discussed current diagnoses and treatment options.    Sutures removed from surgical site Steri-Strips applied.  These can be removed in 5-7 days.  At that time patient can get foot wet.    Rx DM Extra depth shoes with 3 pair custom trilayer accommodative insoles with toe filler on the right.    Patient to monitor for signs and symptoms of infection return to my office at that time.  Otherwise patient may follow up in 6-12 months.        Tamanna Peterson DPM     Name band;

## 2024-07-30 ENCOUNTER — NON-APPOINTMENT (OUTPATIENT)
Age: 29
End: 2024-07-30

## 2024-08-29 ENCOUNTER — RX RENEWAL (OUTPATIENT)
Age: 29
End: 2024-08-29

## 2024-09-04 ENCOUNTER — RX RENEWAL (OUTPATIENT)
Age: 29
End: 2024-09-04

## 2024-11-27 ENCOUNTER — RX RENEWAL (OUTPATIENT)
Age: 29
End: 2024-11-27

## 2024-12-09 ENCOUNTER — NON-APPOINTMENT (OUTPATIENT)
Age: 29
End: 2024-12-09

## 2025-01-07 ENCOUNTER — APPOINTMENT (OUTPATIENT)
Dept: PSYCHIATRY | Facility: CLINIC | Age: 30
End: 2025-01-07
Payer: COMMERCIAL

## 2025-01-07 PROCEDURE — 90791 PSYCH DIAGNOSTIC EVALUATION: CPT | Mod: 95

## 2025-01-13 ENCOUNTER — APPOINTMENT (OUTPATIENT)
Dept: PSYCHIATRY | Facility: CLINIC | Age: 30
End: 2025-01-13

## 2025-01-13 DIAGNOSIS — F32.0 MAJOR DEPRESSIVE DISORDER, SINGLE EPISODE, MILD: ICD-10-CM

## 2025-01-13 DIAGNOSIS — F41.1 GENERALIZED ANXIETY DISORDER: ICD-10-CM

## 2025-01-13 DIAGNOSIS — F43.10 POST-TRAUMATIC STRESS DISORDER, UNSPECIFIED: ICD-10-CM

## 2025-01-13 PROCEDURE — 90837 PSYTX W PT 60 MINUTES: CPT | Mod: 95

## 2025-01-27 ENCOUNTER — APPOINTMENT (OUTPATIENT)
Dept: PSYCHIATRY | Facility: CLINIC | Age: 30
End: 2025-01-27

## 2025-01-27 DIAGNOSIS — F41.1 GENERALIZED ANXIETY DISORDER: ICD-10-CM

## 2025-01-27 DIAGNOSIS — F43.10 POST-TRAUMATIC STRESS DISORDER, UNSPECIFIED: ICD-10-CM

## 2025-01-27 PROCEDURE — 90837 PSYTX W PT 60 MINUTES: CPT | Mod: 95

## 2025-01-28 ENCOUNTER — RESULT REVIEW (OUTPATIENT)
Age: 30
End: 2025-01-28

## 2025-01-28 ENCOUNTER — APPOINTMENT (OUTPATIENT)
Dept: CT IMAGING | Facility: IMAGING CENTER | Age: 30
End: 2025-01-28
Payer: COMMERCIAL

## 2025-01-28 ENCOUNTER — RESULT CHARGE (OUTPATIENT)
Age: 30
End: 2025-01-28

## 2025-01-28 ENCOUNTER — OUTPATIENT (OUTPATIENT)
Dept: OUTPATIENT SERVICES | Facility: HOSPITAL | Age: 30
LOS: 1 days | End: 2025-01-28
Payer: COMMERCIAL

## 2025-01-28 ENCOUNTER — APPOINTMENT (OUTPATIENT)
Dept: INTERNAL MEDICINE | Facility: CLINIC | Age: 30
End: 2025-01-28
Payer: COMMERCIAL

## 2025-01-28 VITALS
TEMPERATURE: 97.8 F | BODY MASS INDEX: 31.24 KG/M2 | OXYGEN SATURATION: 98 % | DIASTOLIC BLOOD PRESSURE: 74 MMHG | HEART RATE: 74 BPM | HEIGHT: 64 IN | SYSTOLIC BLOOD PRESSURE: 118 MMHG | WEIGHT: 183 LBS

## 2025-01-28 DIAGNOSIS — R31.9 HEMATURIA, UNSPECIFIED: ICD-10-CM

## 2025-01-28 DIAGNOSIS — R10.31 RIGHT LOWER QUADRANT PAIN: ICD-10-CM

## 2025-01-28 LAB
BILIRUB UR QL STRIP: NEGATIVE
CLARITY UR: CLEAR
COLLECTION METHOD: NORMAL
GLUCOSE UR-MCNC: NEGATIVE
HCG UR QL: 0.2 EU/DL
HGB UR QL STRIP.AUTO: ABNORMAL
KETONES UR-MCNC: NEGATIVE
LEUKOCYTE ESTERASE UR QL STRIP: NEGATIVE
NITRITE UR QL STRIP: NEGATIVE
PH UR STRIP: 6.5
PROT UR STRIP-MCNC: NEGATIVE
SP GR UR STRIP: 1.02

## 2025-01-28 PROCEDURE — 36416 COLLJ CAPILLARY BLOOD SPEC: CPT

## 2025-01-28 PROCEDURE — G2211 COMPLEX E/M VISIT ADD ON: CPT

## 2025-01-28 PROCEDURE — 74177 CT ABD & PELVIS W/CONTRAST: CPT

## 2025-01-28 PROCEDURE — 99214 OFFICE O/P EST MOD 30 MIN: CPT

## 2025-01-28 PROCEDURE — 74177 CT ABD & PELVIS W/CONTRAST: CPT | Mod: 26

## 2025-01-28 PROCEDURE — 81003 URINALYSIS AUTO W/O SCOPE: CPT | Mod: QW

## 2025-01-30 LAB
ALBUMIN SERPL ELPH-MCNC: 4.6 G/DL
ALP BLD-CCNC: 60 U/L
ALT SERPL-CCNC: 17 U/L
ANION GAP SERPL CALC-SCNC: 15 MMOL/L
AST SERPL-CCNC: 23 U/L
BACTERIA UR CULT: NORMAL
BASOPHILS # BLD AUTO: 0.02 K/UL
BASOPHILS NFR BLD AUTO: 0.3 %
BILIRUB SERPL-MCNC: 0.3 MG/DL
BUN SERPL-MCNC: 15 MG/DL
CALCIUM SERPL-MCNC: 10.2 MG/DL
CHLORIDE SERPL-SCNC: 103 MMOL/L
CO2 SERPL-SCNC: 23 MMOL/L
CREAT SERPL-MCNC: 0.61 MG/DL
EGFR: 122 ML/MIN/1.73M2
EOSINOPHIL # BLD AUTO: 0.06 K/UL
EOSINOPHIL NFR BLD AUTO: 0.9 %
GLUCOSE SERPL-MCNC: 96 MG/DL
HCT VFR BLD CALC: 40.8 %
HGB BLD-MCNC: 13.1 G/DL
IMM GRANULOCYTES NFR BLD AUTO: 0.1 %
LYMPHOCYTES # BLD AUTO: 1.91 K/UL
LYMPHOCYTES NFR BLD AUTO: 28.1 %
MAN DIFF?: NORMAL
MCHC RBC-ENTMCNC: 32.1 G/DL
MCHC RBC-ENTMCNC: 32.3 PG
MCV RBC AUTO: 100.5 FL
MONOCYTES # BLD AUTO: 0.53 K/UL
MONOCYTES NFR BLD AUTO: 7.8 %
NEUTROPHILS # BLD AUTO: 4.26 K/UL
NEUTROPHILS NFR BLD AUTO: 62.8 %
PLATELET # BLD AUTO: 234 K/UL
POTASSIUM SERPL-SCNC: 5 MMOL/L
PROT SERPL-MCNC: 7.4 G/DL
RBC # BLD: 4.06 M/UL
RBC # FLD: 12.6 %
SODIUM SERPL-SCNC: 141 MMOL/L
WBC # FLD AUTO: 6.79 K/UL

## 2025-02-07 ENCOUNTER — APPOINTMENT (OUTPATIENT)
Dept: PSYCHIATRY | Facility: CLINIC | Age: 30
End: 2025-02-07
Payer: COMMERCIAL

## 2025-02-07 PROCEDURE — 90837 PSYTX W PT 60 MINUTES: CPT | Mod: 95

## 2025-02-14 ENCOUNTER — APPOINTMENT (OUTPATIENT)
Dept: PSYCHIATRY | Facility: CLINIC | Age: 30
End: 2025-02-14
Payer: COMMERCIAL

## 2025-02-14 PROCEDURE — 90834 PSYTX W PT 45 MINUTES: CPT | Mod: 95

## 2025-02-21 ENCOUNTER — APPOINTMENT (OUTPATIENT)
Dept: PSYCHIATRY | Facility: CLINIC | Age: 30
End: 2025-02-21
Payer: COMMERCIAL

## 2025-02-21 PROCEDURE — 90837 PSYTX W PT 60 MINUTES: CPT | Mod: 95

## 2025-02-24 ENCOUNTER — NON-APPOINTMENT (OUTPATIENT)
Age: 30
End: 2025-02-24

## 2025-02-26 ENCOUNTER — RX RENEWAL (OUTPATIENT)
Age: 30
End: 2025-02-26

## 2025-02-28 ENCOUNTER — APPOINTMENT (OUTPATIENT)
Dept: PSYCHIATRY | Facility: CLINIC | Age: 30
End: 2025-02-28
Payer: COMMERCIAL

## 2025-02-28 PROCEDURE — 90837 PSYTX W PT 60 MINUTES: CPT | Mod: 95

## 2025-03-07 ENCOUNTER — APPOINTMENT (OUTPATIENT)
Dept: PSYCHIATRY | Facility: CLINIC | Age: 30
End: 2025-03-07
Payer: COMMERCIAL

## 2025-03-07 PROCEDURE — 90837 PSYTX W PT 60 MINUTES: CPT | Mod: 95

## 2025-03-14 ENCOUNTER — APPOINTMENT (OUTPATIENT)
Dept: PSYCHIATRY | Facility: CLINIC | Age: 30
End: 2025-03-14

## 2025-03-21 ENCOUNTER — APPOINTMENT (OUTPATIENT)
Dept: PSYCHIATRY | Facility: CLINIC | Age: 30
End: 2025-03-21

## 2025-03-21 PROCEDURE — 90834 PSYTX W PT 45 MINUTES: CPT | Mod: 95

## 2025-03-26 ENCOUNTER — APPOINTMENT (OUTPATIENT)
Dept: INTERNAL MEDICINE | Facility: CLINIC | Age: 30
End: 2025-03-26
Payer: COMMERCIAL

## 2025-03-26 VITALS
DIASTOLIC BLOOD PRESSURE: 72 MMHG | OXYGEN SATURATION: 98 % | SYSTOLIC BLOOD PRESSURE: 116 MMHG | BODY MASS INDEX: 30.56 KG/M2 | HEART RATE: 62 BPM | HEIGHT: 64 IN | TEMPERATURE: 97.8 F | RESPIRATION RATE: 14 BRPM | WEIGHT: 179 LBS

## 2025-03-26 DIAGNOSIS — F41.0 PANIC DISORDER [EPISODIC PAROXYSMAL ANXIETY]: ICD-10-CM

## 2025-03-26 DIAGNOSIS — F32.A ANXIETY DISORDER, UNSPECIFIED: ICD-10-CM

## 2025-03-26 DIAGNOSIS — F41.9 ANXIETY DISORDER, UNSPECIFIED: ICD-10-CM

## 2025-03-26 PROCEDURE — 99214 OFFICE O/P EST MOD 30 MIN: CPT

## 2025-03-26 PROCEDURE — G2211 COMPLEX E/M VISIT ADD ON: CPT

## 2025-03-26 RX ORDER — ALPRAZOLAM 0.25 MG/1
0.25 TABLET ORAL
Qty: 30 | Refills: 0 | Status: ACTIVE | COMMUNITY
Start: 2025-03-26 | End: 1900-01-01

## 2025-03-27 ENCOUNTER — APPOINTMENT (OUTPATIENT)
Dept: PSYCHIATRY | Facility: CLINIC | Age: 30
End: 2025-03-27
Payer: COMMERCIAL

## 2025-03-27 PROCEDURE — 90837 PSYTX W PT 60 MINUTES: CPT | Mod: 95

## 2025-03-28 ENCOUNTER — APPOINTMENT (OUTPATIENT)
Dept: PSYCHIATRY | Facility: CLINIC | Age: 30
End: 2025-03-28

## 2025-04-02 RX ORDER — VORTIOXETINE 10 MG/1
10 TABLET, FILM COATED ORAL
Qty: 90 | Refills: 1 | Status: ACTIVE | COMMUNITY
Start: 2025-03-26 | End: 1900-01-01

## 2025-04-04 ENCOUNTER — APPOINTMENT (OUTPATIENT)
Dept: PSYCHIATRY | Facility: CLINIC | Age: 30
End: 2025-04-04
Payer: COMMERCIAL

## 2025-04-04 PROCEDURE — 90837 PSYTX W PT 60 MINUTES: CPT | Mod: 95

## 2025-04-11 ENCOUNTER — APPOINTMENT (OUTPATIENT)
Dept: PSYCHIATRY | Facility: CLINIC | Age: 30
End: 2025-04-11
Payer: COMMERCIAL

## 2025-04-11 PROCEDURE — 90837 PSYTX W PT 60 MINUTES: CPT | Mod: 95

## 2025-04-13 ENCOUNTER — NON-APPOINTMENT (OUTPATIENT)
Age: 30
End: 2025-04-13

## 2025-04-18 ENCOUNTER — APPOINTMENT (OUTPATIENT)
Dept: PSYCHIATRY | Facility: CLINIC | Age: 30
End: 2025-04-18
Payer: COMMERCIAL

## 2025-04-18 PROCEDURE — 90837 PSYTX W PT 60 MINUTES: CPT | Mod: 95

## 2025-04-25 ENCOUNTER — APPOINTMENT (OUTPATIENT)
Dept: PSYCHIATRY | Facility: CLINIC | Age: 30
End: 2025-04-25
Payer: COMMERCIAL

## 2025-04-25 PROCEDURE — 90834 PSYTX W PT 45 MINUTES: CPT | Mod: 95

## 2025-05-02 ENCOUNTER — APPOINTMENT (OUTPATIENT)
Dept: PSYCHIATRY | Facility: CLINIC | Age: 30
End: 2025-05-02
Payer: COMMERCIAL

## 2025-05-02 PROCEDURE — 90837 PSYTX W PT 60 MINUTES: CPT | Mod: 95

## 2025-05-09 ENCOUNTER — APPOINTMENT (OUTPATIENT)
Dept: PSYCHIATRY | Facility: CLINIC | Age: 30
End: 2025-05-09

## 2025-05-09 ENCOUNTER — RX RENEWAL (OUTPATIENT)
Age: 30
End: 2025-05-09

## 2025-05-16 ENCOUNTER — APPOINTMENT (OUTPATIENT)
Dept: PSYCHIATRY | Facility: CLINIC | Age: 30
End: 2025-05-16
Payer: COMMERCIAL

## 2025-05-16 PROCEDURE — 90834 PSYTX W PT 45 MINUTES: CPT | Mod: 95

## 2025-05-22 ENCOUNTER — APPOINTMENT (OUTPATIENT)
Dept: PSYCHIATRY | Facility: CLINIC | Age: 30
End: 2025-05-22
Payer: COMMERCIAL

## 2025-05-22 PROCEDURE — 90834 PSYTX W PT 45 MINUTES: CPT | Mod: 95

## 2025-05-23 ENCOUNTER — RX RENEWAL (OUTPATIENT)
Age: 30
End: 2025-05-23

## 2025-05-23 ENCOUNTER — APPOINTMENT (OUTPATIENT)
Dept: PSYCHIATRY | Facility: CLINIC | Age: 30
End: 2025-05-23

## 2025-05-24 ENCOUNTER — APPOINTMENT (OUTPATIENT)
Age: 30
End: 2025-05-24
Attending: PHYSICIAN ASSISTANT
Payer: COMMERCIAL

## 2025-05-24 ENCOUNTER — OFFICE VISIT (OUTPATIENT)
Age: 30
End: 2025-05-24
Payer: COMMERCIAL

## 2025-05-24 VITALS
TEMPERATURE: 97.4 F | SYSTOLIC BLOOD PRESSURE: 150 MMHG | HEART RATE: 70 BPM | OXYGEN SATURATION: 98 % | DIASTOLIC BLOOD PRESSURE: 85 MMHG | RESPIRATION RATE: 18 BRPM

## 2025-05-24 DIAGNOSIS — M25.571 RIGHT ANKLE PAIN, UNSPECIFIED CHRONICITY: ICD-10-CM

## 2025-05-24 DIAGNOSIS — S93.491A SPRAIN OF ANTERIOR TALOFIBULAR LIGAMENT OF RIGHT ANKLE, INITIAL ENCOUNTER: ICD-10-CM
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
## 2025-05-24 DIAGNOSIS — M25.571 RIGHT ANKLE PAIN, UNSPECIFIED CHRONICITY: Primary | ICD-10-CM

## 2025-05-24 PROCEDURE — S9083 URGENT CARE CENTER GLOBAL: HCPCS | Performed by: PHYSICIAN ASSISTANT

## 2025-05-24 PROCEDURE — G0382 LEV 3 HOSP TYPE B ED VISIT: HCPCS | Performed by: PHYSICIAN ASSISTANT

## 2025-05-24 PROCEDURE — 73610 X-RAY EXAM OF ANKLE: CPT

## 2025-05-24 RX ORDER — ROSUVASTATIN CALCIUM 10 MG/1
TABLET, COATED ORAL
COMMUNITY
Start: 2025-05-06

## 2025-05-24 RX ORDER — VORTIOXETINE 10 MG/1
TABLET, FILM COATED ORAL
COMMUNITY
Start: 2025-04-09

## 2025-05-24 NOTE — PATIENT INSTRUCTIONS
Please ice for 20 minutes at least four times daily and keep injury elevated as much as possible until symptoms improve.     Please perform gentle range of motion four times daily    Ankle brace for the next 3-5 days and wean as tolerated    We will call you if radiology read changes pan of care

## 2025-05-24 NOTE — PROGRESS NOTES
[facility]        NAME: Taryn Gomez is a 30 y.o. female  : 1995    MRN: [id number]  DATE: May 24, 2025  TIME: 1:13 PM      Assessment and Plan     Right ankle pain, unspecified chronicity [M25.571]  1. Right ankle pain, unspecified chronicity  XR ankle 3+ vw right      2. Sprain of anterior talofibular ligament of right ankle, initial encounter        3. Fall from slip, trip, or stumble, initial encounter            POC Testing Results        Note:       Patient Instructions     Patient Instructions    Please ice for 20 minutes at least four times daily and keep injury elevated as much as possible until symptoms improve.     Please perform gentle range of motion four times daily    Ankle brace for the next 3-5 days and wean as tolerated    We will call you if radiology read changes pan of care     Follow up with primary care provider.   Go to ER if symptoms worsen.    Chief Complaint     Chief Complaint   Patient presents with    Ankle Injury     Pt was walking when she twist her right ankle.          History of Present Illness     Pt reports today she was walking in [employer] area when she fell, unsure what she stumbled on. She reports lateral right ankle pain. She denies LOC or hitting her head. She denies prior injury to ankle or numbness/tingling.        Review of Systems     Review of Systems   Constitutional:  Negative for fever.   Musculoskeletal:  Positive for arthralgias, gait problem and joint swelling.   Skin:  Negative for color change, rash and wound.   Neurological:  Negative for syncope and numbness.         Current Medications     Current Medications[1]    Current Allergies     Allergies as of 2025    (No Known Allergies)              The following portions of the patient's history were reviewed and updated as appropriate: allergies, current medications, past family history, past medical history, past social history, past surgical history, and problem list.     Past Medical  History[2]    Past Surgical History[3]    Family History[4]      Medications have been verified.        Objective     /85   Pulse 70   Temp (!) 97.4 °F (36.3 °C)   Resp 18   SpO2 98%   No LMP recorded.         Physical Exam     Physical Exam  Vitals and nursing note reviewed.   Constitutional:       Appearance: Normal appearance.   HENT:      Head: Normocephalic and atraumatic.     Eyes:      Conjunctiva/sclera: Conjunctivae normal.       Cardiovascular:      Rate and Rhythm: Normal rate and regular rhythm.      Pulses: Normal pulses.   Pulmonary:      Effort: Pulmonary effort is normal.     Musculoskeletal:      Right ankle: Swelling present. No deformity, ecchymosis or lacerations. Tenderness present over the lateral malleolus and ATF ligament. Normal range of motion. Anterior drawer test negative. Normal pulse.      Right Achilles Tendon: Normal. No tenderness or defects. Parikh's test negative.      Comments: Pain with talar test     Skin:     General: Skin is warm and dry.      Capillary Refill: Capillary refill takes less than 2 seconds.     Neurological:      General: No focal deficit present.      Mental Status: She is alert and oriented to person, place, and time. Mental status is at baseline.     Psychiatric:         Mood and Affect: Mood normal.         Behavior: Behavior normal.         Thought Content: Thought content normal.         Judgment: Judgment normal.       Splint application    Date/Time: 5/24/2025 1:00 PM    Performed by: Erma Thompson PA-C  Authorized by: Erma Thompson PA-C    Verbal consent obtained?: Yes    Risks and benefits: Risks, benefits and alternatives were discussed    Consent given by:  Patient  Patient states understanding of procedure being performed: Yes    Patient's understanding of procedure matches consent: Yes    Patient identity confirmed:  Verbally with patient  Pre-procedure details:     Sensation:  Normal    Skin color:  Pink  Procedure details:      Laterality:  Right    Location:  Ankle    Ankle:  R ankle    Splint composition: static    Post-procedure details:     Pain:  Improved    Sensation:  Normal    Patient tolerance of procedure:  Tolerated well, no immediate complications  Comments:      Ankle aircast applied           [1]   Current Outpatient Medications:     rosuvastatin (CRESTOR) 10 MG tablet, , Disp: , Rfl:     Trintellix 10 MG tablet, , Disp: , Rfl:   [2] No past medical history on file.  [3] No past surgical history on file.  [4] No family history on file.

## 2025-05-27 ENCOUNTER — NON-APPOINTMENT (OUTPATIENT)
Age: 30
End: 2025-05-27

## 2025-05-29 ENCOUNTER — APPOINTMENT (OUTPATIENT)
Dept: ORTHOPEDIC SURGERY | Facility: CLINIC | Age: 30
End: 2025-05-29
Payer: COMMERCIAL

## 2025-05-29 VITALS — WEIGHT: 180 LBS | HEIGHT: 64 IN | BODY MASS INDEX: 30.73 KG/M2

## 2025-05-29 DIAGNOSIS — S93.491A SPRAIN OF OTHER LIGAMENT OF RIGHT ANKLE, INITIAL ENCOUNTER: ICD-10-CM

## 2025-05-29 PROCEDURE — 99203 OFFICE O/P NEW LOW 30 MIN: CPT

## 2025-05-29 PROCEDURE — L4350: CPT | Mod: RT

## 2025-05-29 PROCEDURE — 73610 X-RAY EXAM OF ANKLE: CPT | Mod: RT

## 2025-05-30 ENCOUNTER — APPOINTMENT (OUTPATIENT)
Dept: PSYCHIATRY | Facility: CLINIC | Age: 30
End: 2025-05-30
Payer: COMMERCIAL

## 2025-05-30 PROCEDURE — 90837 PSYTX W PT 60 MINUTES: CPT | Mod: 95

## 2025-06-06 ENCOUNTER — APPOINTMENT (OUTPATIENT)
Dept: PSYCHIATRY | Facility: CLINIC | Age: 30
End: 2025-06-06
Payer: COMMERCIAL

## 2025-06-06 PROCEDURE — 90837 PSYTX W PT 60 MINUTES: CPT | Mod: 95

## 2025-06-09 ENCOUNTER — NON-APPOINTMENT (OUTPATIENT)
Age: 30
End: 2025-06-09

## 2025-06-13 ENCOUNTER — APPOINTMENT (OUTPATIENT)
Dept: PSYCHIATRY | Facility: CLINIC | Age: 30
End: 2025-06-13
Payer: COMMERCIAL

## 2025-06-13 PROCEDURE — 90837 PSYTX W PT 60 MINUTES: CPT | Mod: 95

## 2025-06-19 ENCOUNTER — APPOINTMENT (OUTPATIENT)
Dept: ORTHOPEDIC SURGERY | Facility: CLINIC | Age: 30
End: 2025-06-19
Payer: COMMERCIAL

## 2025-06-19 VITALS — HEIGHT: 64 IN | WEIGHT: 180 LBS | BODY MASS INDEX: 30.73 KG/M2

## 2025-06-19 PROBLEM — S93.491D SPRAIN OF ANTERIOR TALOFIBULAR LIGAMENT OF RIGHT ANKLE, SUBSEQUENT ENCOUNTER: Status: ACTIVE | Noted: 2025-06-19

## 2025-06-19 PROCEDURE — 99213 OFFICE O/P EST LOW 20 MIN: CPT

## 2025-06-20 ENCOUNTER — APPOINTMENT (OUTPATIENT)
Dept: OTOLARYNGOLOGY | Facility: CLINIC | Age: 30
End: 2025-06-20
Payer: COMMERCIAL

## 2025-06-20 ENCOUNTER — APPOINTMENT (OUTPATIENT)
Dept: PSYCHIATRY | Facility: CLINIC | Age: 30
End: 2025-06-20

## 2025-06-20 VITALS
BODY MASS INDEX: 30.56 KG/M2 | HEART RATE: 66 BPM | HEIGHT: 64 IN | SYSTOLIC BLOOD PRESSURE: 143 MMHG | WEIGHT: 179 LBS | DIASTOLIC BLOOD PRESSURE: 87 MMHG

## 2025-06-20 PROBLEM — H60.90 OTITIS EXTERNA, UNSPECIFIED CHRONICITY, UNSPECIFIED LATERALITY, UNSPECIFIED TYPE: Status: ACTIVE | Noted: 2025-06-20

## 2025-06-20 PROBLEM — H92.09 EAR DISCOMFORT: Status: ACTIVE | Noted: 2025-06-20

## 2025-06-20 PROBLEM — Z86.39 HISTORY OF HIGH CHOLESTEROL: Status: RESOLVED | Noted: 2025-06-20 | Resolved: 2025-06-20

## 2025-06-20 PROCEDURE — 92567 TYMPANOMETRY: CPT

## 2025-06-20 PROCEDURE — 99203 OFFICE O/P NEW LOW 30 MIN: CPT

## 2025-06-20 PROCEDURE — 92557 COMPREHENSIVE HEARING TEST: CPT

## 2025-06-20 RX ORDER — NEOMYCIN AND POLYMYXIN B SULFATES AND HYDROCORTISONE OTIC 10; 3.5; 1 MG/ML; MG/ML; [USP'U]/ML
3.5-10000-1 SUSPENSION AURICULAR (OTIC) 4 TIMES DAILY
Qty: 1 | Refills: 2 | Status: ACTIVE | COMMUNITY
Start: 2025-06-20 | End: 1900-01-01

## 2025-06-27 ENCOUNTER — APPOINTMENT (OUTPATIENT)
Dept: PSYCHIATRY | Facility: CLINIC | Age: 30
End: 2025-06-27

## 2025-07-11 ENCOUNTER — APPOINTMENT (OUTPATIENT)
Dept: PSYCHIATRY | Facility: CLINIC | Age: 30
End: 2025-07-11
Payer: COMMERCIAL

## 2025-07-11 PROCEDURE — 90837 PSYTX W PT 60 MINUTES: CPT | Mod: 95

## 2025-07-16 ENCOUNTER — APPOINTMENT (OUTPATIENT)
Dept: OBGYN | Facility: CLINIC | Age: 30
End: 2025-07-16
Payer: COMMERCIAL

## 2025-07-16 ENCOUNTER — APPOINTMENT (OUTPATIENT)
Dept: INTERNAL MEDICINE | Facility: CLINIC | Age: 30
End: 2025-07-16
Payer: COMMERCIAL

## 2025-07-16 ENCOUNTER — NON-APPOINTMENT (OUTPATIENT)
Age: 30
End: 2025-07-16

## 2025-07-16 VITALS
DIASTOLIC BLOOD PRESSURE: 80 MMHG | BODY MASS INDEX: 31.07 KG/M2 | OXYGEN SATURATION: 98 % | HEART RATE: 62 BPM | WEIGHT: 182 LBS | HEIGHT: 64 IN | SYSTOLIC BLOOD PRESSURE: 114 MMHG | TEMPERATURE: 98.9 F

## 2025-07-16 VITALS
HEIGHT: 64 IN | DIASTOLIC BLOOD PRESSURE: 80 MMHG | BODY MASS INDEX: 30.56 KG/M2 | SYSTOLIC BLOOD PRESSURE: 126 MMHG | WEIGHT: 179 LBS

## 2025-07-16 PROBLEM — E66.9 OBESITY (BMI 30-39.9): Status: ACTIVE | Noted: 2025-07-16

## 2025-07-16 PROBLEM — R92.30 DENSE BREASTS: Status: ACTIVE | Noted: 2025-07-16

## 2025-07-16 PROBLEM — Z82.49 FAMILY HISTORY OF CORONARY ARTERY DISEASE: Status: ACTIVE | Noted: 2025-07-16

## 2025-07-16 PROBLEM — E55.9 VITAMIN D DEFICIENCY: Status: ACTIVE | Noted: 2025-07-16

## 2025-07-16 PROCEDURE — 99459 PELVIC EXAMINATION: CPT

## 2025-07-16 PROCEDURE — 99395 PREV VISIT EST AGE 18-39: CPT

## 2025-07-16 PROCEDURE — G2211 COMPLEX E/M VISIT ADD ON: CPT

## 2025-07-16 PROCEDURE — 99204 OFFICE O/P NEW MOD 45 MIN: CPT

## 2025-07-17 ENCOUNTER — APPOINTMENT (OUTPATIENT)
Dept: PSYCHIATRY | Facility: CLINIC | Age: 30
End: 2025-07-17
Payer: COMMERCIAL

## 2025-07-17 PROCEDURE — 90837 PSYTX W PT 60 MINUTES: CPT | Mod: 95

## 2025-07-24 ENCOUNTER — APPOINTMENT (OUTPATIENT)
Dept: PSYCHIATRY | Facility: CLINIC | Age: 30
End: 2025-07-24
Payer: COMMERCIAL

## 2025-07-24 PROCEDURE — 90834 PSYTX W PT 45 MINUTES: CPT | Mod: 95

## 2025-07-31 ENCOUNTER — APPOINTMENT (OUTPATIENT)
Dept: PSYCHIATRY | Facility: CLINIC | Age: 30
End: 2025-07-31
Payer: COMMERCIAL

## 2025-07-31 PROCEDURE — 90837 PSYTX W PT 60 MINUTES: CPT | Mod: 95

## 2025-08-07 ENCOUNTER — APPOINTMENT (OUTPATIENT)
Dept: PSYCHIATRY | Facility: CLINIC | Age: 30
End: 2025-08-07
Payer: COMMERCIAL

## 2025-08-07 PROCEDURE — 90834 PSYTX W PT 45 MINUTES: CPT | Mod: 95

## 2025-08-14 ENCOUNTER — APPOINTMENT (OUTPATIENT)
Dept: PSYCHIATRY | Facility: CLINIC | Age: 30
End: 2025-08-14
Payer: COMMERCIAL

## 2025-08-14 PROCEDURE — 90837 PSYTX W PT 60 MINUTES: CPT | Mod: 95

## 2025-08-21 ENCOUNTER — APPOINTMENT (OUTPATIENT)
Dept: PSYCHIATRY | Facility: CLINIC | Age: 30
End: 2025-08-21
Payer: COMMERCIAL

## 2025-08-21 PROCEDURE — 90837 PSYTX W PT 60 MINUTES: CPT | Mod: 95

## 2025-08-28 ENCOUNTER — APPOINTMENT (OUTPATIENT)
Dept: PSYCHIATRY | Facility: CLINIC | Age: 30
End: 2025-08-28
Payer: COMMERCIAL

## 2025-08-28 PROCEDURE — 90837 PSYTX W PT 60 MINUTES: CPT | Mod: 95

## 2025-09-03 ENCOUNTER — RESULT REVIEW (OUTPATIENT)
Age: 30
End: 2025-09-03

## 2025-09-03 ENCOUNTER — APPOINTMENT (OUTPATIENT)
Dept: ULTRASOUND IMAGING | Facility: IMAGING CENTER | Age: 30
End: 2025-09-03
Payer: COMMERCIAL

## 2025-09-03 ENCOUNTER — OUTPATIENT (OUTPATIENT)
Dept: OUTPATIENT SERVICES | Facility: HOSPITAL | Age: 30
LOS: 1 days | End: 2025-09-03
Payer: COMMERCIAL

## 2025-09-03 DIAGNOSIS — R92.30 DENSE BREASTS, UNSPECIFIED: ICD-10-CM

## 2025-09-03 DIAGNOSIS — Z00.8 ENCOUNTER FOR OTHER GENERAL EXAMINATION: ICD-10-CM

## 2025-09-03 PROCEDURE — 76641 ULTRASOUND BREAST COMPLETE: CPT

## 2025-09-03 PROCEDURE — 76641 ULTRASOUND BREAST COMPLETE: CPT | Mod: 26,LT

## 2025-09-04 ENCOUNTER — NON-APPOINTMENT (OUTPATIENT)
Age: 30
End: 2025-09-04

## 2025-09-09 ENCOUNTER — RX RENEWAL (OUTPATIENT)
Age: 30
End: 2025-09-09

## 2025-09-11 ENCOUNTER — APPOINTMENT (OUTPATIENT)
Dept: PSYCHIATRY | Facility: CLINIC | Age: 30
End: 2025-09-11

## 2025-09-18 ENCOUNTER — APPOINTMENT (OUTPATIENT)
Dept: PSYCHIATRY | Facility: CLINIC | Age: 30
End: 2025-09-18
Payer: COMMERCIAL

## 2025-09-18 PROCEDURE — 90834 PSYTX W PT 45 MINUTES: CPT | Mod: 95
